# Patient Record
Sex: MALE | Race: BLACK OR AFRICAN AMERICAN | NOT HISPANIC OR LATINO | URBAN - METROPOLITAN AREA
[De-identification: names, ages, dates, MRNs, and addresses within clinical notes are randomized per-mention and may not be internally consistent; named-entity substitution may affect disease eponyms.]

---

## 2017-07-17 ENCOUNTER — OUTPATIENT (OUTPATIENT)
Dept: OUTPATIENT SERVICES | Facility: HOSPITAL | Age: 32
LOS: 1 days | Discharge: HOME | End: 2017-07-17

## 2017-07-21 DIAGNOSIS — Z23 ENCOUNTER FOR IMMUNIZATION: ICD-10-CM

## 2024-02-20 ENCOUNTER — INPATIENT (INPATIENT)
Facility: HOSPITAL | Age: 39
LOS: 6 days | Discharge: HOME | DRG: 280 | End: 2024-02-27
Attending: INTERNAL MEDICINE | Admitting: STUDENT IN AN ORGANIZED HEALTH CARE EDUCATION/TRAINING PROGRAM
Payer: SELF-PAY

## 2024-02-20 VITALS
TEMPERATURE: 98 F | DIASTOLIC BLOOD PRESSURE: 142 MMHG | SYSTOLIC BLOOD PRESSURE: 227 MMHG | WEIGHT: 229.94 LBS | RESPIRATION RATE: 20 BRPM | OXYGEN SATURATION: 98 % | HEART RATE: 102 BPM | HEIGHT: 72 IN

## 2024-02-20 DIAGNOSIS — I16.0 HYPERTENSIVE URGENCY: ICD-10-CM

## 2024-02-20 DIAGNOSIS — R79.89 OTHER SPECIFIED ABNORMAL FINDINGS OF BLOOD CHEMISTRY: ICD-10-CM

## 2024-02-20 DIAGNOSIS — N17.9 ACUTE KIDNEY FAILURE, UNSPECIFIED: ICD-10-CM

## 2024-02-20 LAB
ALBUMIN SERPL ELPH-MCNC: 3.7 G/DL — SIGNIFICANT CHANGE UP (ref 3.3–5.2)
ALP SERPL-CCNC: 74 U/L — SIGNIFICANT CHANGE UP (ref 40–120)
ALT FLD-CCNC: 66 U/L — HIGH
ANION GAP SERPL CALC-SCNC: 13 MMOL/L — SIGNIFICANT CHANGE UP (ref 5–17)
APTT BLD: 33.6 SEC — SIGNIFICANT CHANGE UP (ref 24.5–35.6)
AST SERPL-CCNC: 43 U/L — HIGH
BASOPHILS # BLD AUTO: 0.08 K/UL — SIGNIFICANT CHANGE UP (ref 0–0.2)
BASOPHILS NFR BLD AUTO: 0.7 % — SIGNIFICANT CHANGE UP (ref 0–2)
BILIRUB SERPL-MCNC: 0.5 MG/DL — SIGNIFICANT CHANGE UP (ref 0.4–2)
BUN SERPL-MCNC: 34.5 MG/DL — HIGH (ref 8–20)
CALCIUM SERPL-MCNC: 8.9 MG/DL — SIGNIFICANT CHANGE UP (ref 8.4–10.5)
CHLORIDE SERPL-SCNC: 101 MMOL/L — SIGNIFICANT CHANGE UP (ref 96–108)
CK SERPL-CCNC: 117 U/L — SIGNIFICANT CHANGE UP (ref 30–200)
CO2 SERPL-SCNC: 23 MMOL/L — SIGNIFICANT CHANGE UP (ref 22–29)
CREAT SERPL-MCNC: 2.61 MG/DL — HIGH (ref 0.5–1.3)
EGFR: 31 ML/MIN/1.73M2 — LOW
EOSINOPHIL # BLD AUTO: 0.26 K/UL — SIGNIFICANT CHANGE UP (ref 0–0.5)
EOSINOPHIL NFR BLD AUTO: 2.3 % — SIGNIFICANT CHANGE UP (ref 0–6)
GLUCOSE SERPL-MCNC: 104 MG/DL — HIGH (ref 70–99)
HCT VFR BLD CALC: 33.2 % — LOW (ref 39–50)
HGB BLD-MCNC: 10.5 G/DL — LOW (ref 13–17)
IMM GRANULOCYTES NFR BLD AUTO: 0.4 % — SIGNIFICANT CHANGE UP (ref 0–0.9)
INR BLD: 1.17 RATIO — SIGNIFICANT CHANGE UP (ref 0.85–1.18)
LYMPHOCYTES # BLD AUTO: 1.94 K/UL — SIGNIFICANT CHANGE UP (ref 1–3.3)
LYMPHOCYTES # BLD AUTO: 16.9 % — SIGNIFICANT CHANGE UP (ref 13–44)
MCHC RBC-ENTMCNC: 25.9 PG — LOW (ref 27–34)
MCHC RBC-ENTMCNC: 31.6 GM/DL — LOW (ref 32–36)
MCV RBC AUTO: 81.8 FL — SIGNIFICANT CHANGE UP (ref 80–100)
MONOCYTES # BLD AUTO: 1.08 K/UL — HIGH (ref 0–0.9)
MONOCYTES NFR BLD AUTO: 9.4 % — SIGNIFICANT CHANGE UP (ref 2–14)
NEUTROPHILS # BLD AUTO: 8.1 K/UL — HIGH (ref 1.8–7.4)
NEUTROPHILS NFR BLD AUTO: 70.3 % — SIGNIFICANT CHANGE UP (ref 43–77)
NT-PROBNP SERPL-SCNC: 5093 PG/ML — HIGH (ref 0–300)
PLATELET # BLD AUTO: 371 K/UL — SIGNIFICANT CHANGE UP (ref 150–400)
POTASSIUM SERPL-MCNC: 3.7 MMOL/L — SIGNIFICANT CHANGE UP (ref 3.5–5.3)
POTASSIUM SERPL-SCNC: 3.7 MMOL/L — SIGNIFICANT CHANGE UP (ref 3.5–5.3)
PROT SERPL-MCNC: 6.1 G/DL — LOW (ref 6.6–8.7)
PROTHROM AB SERPL-ACNC: 12.9 SEC — SIGNIFICANT CHANGE UP (ref 9.5–13)
RBC # BLD: 4.06 M/UL — LOW (ref 4.2–5.8)
RBC # FLD: 16.6 % — HIGH (ref 10.3–14.5)
SODIUM SERPL-SCNC: 137 MMOL/L — SIGNIFICANT CHANGE UP (ref 135–145)
TROPONIN T, HIGH SENSITIVITY RESULT: 67 NG/L — HIGH (ref 0–51)
TROPONIN T, HIGH SENSITIVITY RESULT: 71 NG/L — HIGH (ref 0–51)
TSH SERPL-MCNC: 1.23 UIU/ML — SIGNIFICANT CHANGE UP (ref 0.27–4.2)
WBC # BLD: 11.51 K/UL — HIGH (ref 3.8–10.5)
WBC # FLD AUTO: 11.51 K/UL — HIGH (ref 3.8–10.5)

## 2024-02-20 PROCEDURE — 71045 X-RAY EXAM CHEST 1 VIEW: CPT | Mod: 26

## 2024-02-20 PROCEDURE — 99223 1ST HOSP IP/OBS HIGH 75: CPT

## 2024-02-20 PROCEDURE — 99254 IP/OBS CNSLTJ NEW/EST MOD 60: CPT

## 2024-02-20 PROCEDURE — 99285 EMERGENCY DEPT VISIT HI MDM: CPT

## 2024-02-20 PROCEDURE — 70450 CT HEAD/BRAIN W/O DYE: CPT | Mod: 26,MA

## 2024-02-20 PROCEDURE — 93010 ELECTROCARDIOGRAM REPORT: CPT

## 2024-02-20 RX ORDER — ACETAMINOPHEN 500 MG
650 TABLET ORAL EVERY 6 HOURS
Refills: 0 | Status: DISCONTINUED | OUTPATIENT
Start: 2024-02-20 | End: 2024-02-27

## 2024-02-20 RX ORDER — ONDANSETRON 8 MG/1
4 TABLET, FILM COATED ORAL EVERY 8 HOURS
Refills: 0 | Status: DISCONTINUED | OUTPATIENT
Start: 2024-02-20 | End: 2024-02-27

## 2024-02-20 RX ORDER — LANOLIN ALCOHOL/MO/W.PET/CERES
3 CREAM (GRAM) TOPICAL AT BEDTIME
Refills: 0 | Status: DISCONTINUED | OUTPATIENT
Start: 2024-02-20 | End: 2024-02-27

## 2024-02-20 RX ORDER — MAGNESIUM SULFATE 500 MG/ML
2 VIAL (ML) INJECTION ONCE
Refills: 0 | Status: COMPLETED | OUTPATIENT
Start: 2024-02-20 | End: 2024-02-20

## 2024-02-20 RX ORDER — METOCLOPRAMIDE HCL 10 MG
10 TABLET ORAL ONCE
Refills: 0 | Status: COMPLETED | OUTPATIENT
Start: 2024-02-20 | End: 2024-02-20

## 2024-02-20 RX ORDER — LABETALOL HCL 100 MG
10 TABLET ORAL ONCE
Refills: 0 | Status: COMPLETED | OUTPATIENT
Start: 2024-02-20 | End: 2024-02-20

## 2024-02-20 RX ORDER — HYDRALAZINE HCL 50 MG
100 TABLET ORAL EVERY 8 HOURS
Refills: 0 | Status: DISCONTINUED | OUTPATIENT
Start: 2024-02-20 | End: 2024-02-21

## 2024-02-20 RX ORDER — HYDRALAZINE HCL 50 MG
10 TABLET ORAL ONCE
Refills: 0 | Status: COMPLETED | OUTPATIENT
Start: 2024-02-20 | End: 2024-02-20

## 2024-02-20 RX ORDER — FUROSEMIDE 40 MG
20 TABLET ORAL ONCE
Refills: 0 | Status: COMPLETED | OUTPATIENT
Start: 2024-02-20 | End: 2024-02-20

## 2024-02-20 RX ORDER — ACETAMINOPHEN 500 MG
1000 TABLET ORAL ONCE
Refills: 0 | Status: COMPLETED | OUTPATIENT
Start: 2024-02-20 | End: 2024-02-20

## 2024-02-20 RX ORDER — HEPARIN SODIUM 5000 [USP'U]/ML
5000 INJECTION INTRAVENOUS; SUBCUTANEOUS EVERY 8 HOURS
Refills: 0 | Status: DISCONTINUED | OUTPATIENT
Start: 2024-02-20 | End: 2024-02-27

## 2024-02-20 RX ADMIN — Medication 10 MILLIGRAM(S): at 18:50

## 2024-02-20 RX ADMIN — Medication 400 MILLIGRAM(S): at 22:49

## 2024-02-20 RX ADMIN — Medication 100 MILLIGRAM(S): at 18:50

## 2024-02-20 RX ADMIN — Medication 20 MILLIGRAM(S): at 17:23

## 2024-02-20 RX ADMIN — ONDANSETRON 4 MILLIGRAM(S): 8 TABLET, FILM COATED ORAL at 22:56

## 2024-02-20 RX ADMIN — Medication 10 MILLIGRAM(S): at 17:22

## 2024-02-20 RX ADMIN — Medication 100 MILLIGRAM(S): at 22:22

## 2024-02-20 RX ADMIN — Medication 10 MILLIGRAM(S): at 22:49

## 2024-02-20 RX ADMIN — Medication 150 GRAM(S): at 17:23

## 2024-02-20 RX ADMIN — Medication 10 MILLIGRAM(S): at 17:49

## 2024-02-20 NOTE — ED ADULT NURSE NOTE - OBJECTIVE STATEMENT
Pt a&ox3, in ED for elevated BP, headache x 3 weeks, intermittent N/V/D and constipation, and CP when sleeping, no SOB, unlabored breathing, equal rise & fall, able to speak in full sentences, no PMH/PSH, pt does not f/u with a PMD.

## 2024-02-20 NOTE — ED PROVIDER NOTE - CARDIAC, MLM
Monitor: The patient's morbid obesity is improving with lifestyle modifications.  Evaluation: No diagnostic tests required today.  Assessment/Treatment:  Discussed the patient's BMI.  The BMI is above average. The patient received dietary education, feeding regime and exercise education because they have an above normal BMI.  General weight loss/lifestyle modification strategies discussed (elicit support from others; identify saboteurs; non-food rewards, etc).  Informal exercise measures discussed, e.g. taking stairs instead of elevator.  Regular aerobic exercise program discussed.  Condition will be reassessed in 6 months    Normal rate, regular rhythm.  Heart sounds S1, S2.  No murmurs, rubs or gallops.

## 2024-02-20 NOTE — ED ADULT NURSE NOTE - NSFALLUNIVINTERV_ED_ALL_ED
Bed/Stretcher in lowest position, wheels locked, appropriate side rails in place/Call bell, personal items and telephone in reach/Instruct patient to call for assistance before getting out of bed/chair/stretcher/Non-slip footwear applied when patient is off stretcher/[place] to call system/Physically safe environment - no spills, clutter or unnecessary equipment/Purposeful proactive rounding/Room/bathroom lighting operational, light cord in reach

## 2024-02-20 NOTE — ED PROVIDER NOTE - PROGRESS NOTE DETAILS
, attending: patient received as sign out. patient given additional labetolol 10 mg IV. BP improved to 195/121. repeat EKG showed T wave inversion in inferior lateral leads, II, III, AVF, V4-6. patient had elevated trop and creatine, likley demand from hypertension. cardiology seen the patient, will need gradual reduction  of BP, echo, renal sono. patient admitted to medicine.

## 2024-02-20 NOTE — CONSULT NOTE ADULT - ASSESSMENT
37 y/o male with No past medical hx  has not been to a Dr in many years who presents with 3 weeks of headaches, dizziness and vomiting.  Patients symptoms persisted today and he could not take it any longer and he presented to the ER. On evaluation he was found to have a b/p of 227/147.  We are being asked to see him for b/p control.  He states he does not get any chest pain when he walks but did get pain in his chest last night when he went to the bathroom but it was on inspiration and he has had not chest pain today.  He denies any back pain or head trauma  ekg changes c/w LVH  trop 71  bnp [id number]

## 2024-02-20 NOTE — ED ADULT NURSE REASSESSMENT NOTE - NS ED NURSE REASSESS COMMENT FT1
Report given to CDU RAMA Snider. Pt breathing even and unlabored, no acute distress noted. Pt able to make needs known. Pt to be moved to CDU 14L. Pt placed on telebox. family at bedside.

## 2024-02-20 NOTE — H&P ADULT - HISTORY OF PRESENT ILLNESS
Patient seen/examined prior to midnight on 2/20/24.    38M with no PMHX or recent medical care, undomiciled currently living in his car working in [employer], Tobacco Abuse/Chronic Smoker presents to [facility] ER c/o worsening HA for past 2 weeks. ROS +SOB/CARDENAS. EKG +Diffuse TWI and LVH. CTH WO negative. Uncontrolled BP on arrival /142 currently 184/104 after IV Hydralazine/Labetalol/Lasix in ED. Labs significant for elevated troponin and pBNP, Cr 2.61 (no hx kidney disease), and anemia. Cardiology Consulted. Pt c/o HA. Family at bedside. Denies current CP/SOB.     ROS negative unless mentioned.    PMHX: Denies  PSHX: Adenoidectomy  FamHx: Denies fam hx CHF  Social Hx: +Current Smoker  NKDA

## 2024-02-20 NOTE — ED PROVIDER NOTE - CARE PLAN
1 Principal Discharge DX:	Hypertensive urgency  Secondary Diagnosis:	Elevated troponin  Secondary Diagnosis:	Acute renal failure

## 2024-02-20 NOTE — ED PROVIDER NOTE - OBJECTIVE STATEMENT
38-year-old male with history of asthma presents with persistent headache for 15 days with episodes of dizziness and lightheadedness to the point where he is ataxic and has to hold the walls to walk.  Today patient had persistent headache and had to leave work and had 1 episode of vomiting.  Patient is also having episodes where he is waking up in the middle of the night and is unable to breathe and will sit up and is able to breathe and will go back to sleep.  Patient has a family history of hypertension but has never been diagnosed with hypertension before.  Patient had no recent weight gain.Patient denies any drug use or fall. trauma

## 2024-02-20 NOTE — ED ADULT NURSE NOTE - CHIEF COMPLAINT QUOTE
pt states she has dizziness nausea headache x 3 weeks, to the point where he had to leave work  A&Ox3, resp wnl,

## 2024-02-20 NOTE — CONSULT NOTE ADULT - PROBLEM SELECTOR RECOMMENDATION 2
Type 2 non stemi from demand ischemia  Had atypical cp  trend trop  control b/p   Echo to evaluate LV function  check lipid panel in am and start statin if necessary  ? chf  no clinical evidence of chf  await echo Type 2 non stemi from demand ischemia  Had atypical cp  trend trop  control b/p   Echo to evaluate LV function  check lipid panel in am and start statin if necessary  ? chf  no clinical evidence of chf  await echo  ischemic work up pending echo results

## 2024-02-20 NOTE — H&P ADULT - NSHPLABSRESULTS_GEN_ALL_CORE
CTH WO IMPRESSION:   No acute intracranial hemorrhage, mass effect, or shift of   the midline structures.

## 2024-02-20 NOTE — H&P ADULT - ASSESSMENT
ASSESSMENT:  38M with no PMHX or recent medical care, undomiciled currently living in his car working in [employer], Tobacco Abuse/Chronic Smoker presents to [facility] ER c/o worsening HA for past 2 weeks admitted for HA 2/2 HTN Urgency c/b Elevated Troponin and ARF v CKD.    PLAN:  HA 2/2 HTN Urgency c/b Elevated Troponin  -CTH WO negative  -EKG NSR LVH Diffuse TW changes  -Admit to Tele  -TTE pending  -Renin/Aldosterone levels pending  -TSH WNL  -Trend Cardiac Enzymes  -Mag Sulfate 2g IV x1  -Check Electrolytes  -Magnesium Sulfate 2g IV x1  -Add Urine Metanephrines as serum metanephrines already ordered  -Check UDS  -Labetalol 10mg IV x2, Hydralazine 10mg IV x1, Lasix 20mg IV x1 in ED  -Appears euvolemic diuretic unnecessary per Cardio  -Hydralazine 100mg PO q8  -Reglan and Ofirmev for HA  -Cardio Consulted ([facility]) appreciate reccs    ARF v CKD  -Likely 2/2 hypertensive renal disease   -CPK WNL  -Renal Artery Duplex/US pending  -Check Urine Studies  -Nephro Consulted ([facility])    Normocytic Anemia likely 2/2 AOCKD  -Chekc B12/Folate/Ferritin/Iron Studies  -Trend CBC    Tobacco Abuse  - Cessation. Nicotine Patch PRN withdrawal    Undomiciled  -Declined SW consultation

## 2024-02-20 NOTE — ED PROVIDER NOTE - CLINICAL SUMMARY MEDICAL DECISION MAKING FREE TEXT BOX
Patient had headache with vomiting  with  high blood pressure , will do Friday CT head control of blood pressure and rule out ACS as acute heart failure and also to the chest x-ray.  Patient will likely need admission for cardiac evaluation for hypertensive emergency paroxysmal nocturnal dyspnea associated with that.  Friday CT showed no acute hemorrhage we will continue blood pressure control and reassess

## 2024-02-21 LAB
AMPHET UR-MCNC: NEGATIVE — SIGNIFICANT CHANGE UP
ANION GAP SERPL CALC-SCNC: 17 MMOL/L — SIGNIFICANT CHANGE UP (ref 5–17)
APPEARANCE UR: CLEAR — SIGNIFICANT CHANGE UP
BACTERIA # UR AUTO: NEGATIVE /HPF — SIGNIFICANT CHANGE UP
BARBITURATES UR SCN-MCNC: NEGATIVE — SIGNIFICANT CHANGE UP
BENZODIAZ UR-MCNC: NEGATIVE — SIGNIFICANT CHANGE UP
BILIRUB UR-MCNC: NEGATIVE — SIGNIFICANT CHANGE UP
BUN SERPL-MCNC: 31.4 MG/DL — HIGH (ref 8–20)
CALCIUM SERPL-MCNC: 8.6 MG/DL — SIGNIFICANT CHANGE UP (ref 8.4–10.5)
CAST: 1 /LPF — SIGNIFICANT CHANGE UP (ref 0–4)
CHLORIDE SERPL-SCNC: 100 MMOL/L — SIGNIFICANT CHANGE UP (ref 96–108)
CHOLEST SERPL-MCNC: 161 MG/DL — SIGNIFICANT CHANGE UP
CO2 SERPL-SCNC: 20 MMOL/L — LOW (ref 22–29)
COCAINE METAB.OTHER UR-MCNC: NEGATIVE — SIGNIFICANT CHANGE UP
COLOR SPEC: YELLOW — SIGNIFICANT CHANGE UP
CREAT ?TM UR-MCNC: 172 MG/DL — SIGNIFICANT CHANGE UP
CREAT SERPL-MCNC: 2.13 MG/DL — HIGH (ref 0.5–1.3)
DIFF PNL FLD: NEGATIVE — SIGNIFICANT CHANGE UP
EGFR: 40 ML/MIN/1.73M2 — LOW
FERRITIN SERPL-MCNC: 40 NG/ML — SIGNIFICANT CHANGE UP (ref 30–400)
FOLATE SERPL-MCNC: 14.2 NG/ML — SIGNIFICANT CHANGE UP
GLUCOSE SERPL-MCNC: 105 MG/DL — HIGH (ref 70–99)
GLUCOSE UR QL: 250 MG/DL
HCT VFR BLD CALC: 35 % — LOW (ref 39–50)
HDLC SERPL-MCNC: 45 MG/DL — SIGNIFICANT CHANGE UP
HGB BLD-MCNC: 11.3 G/DL — LOW (ref 13–17)
IRON SATN MFR SERPL: 23 UG/DL — LOW (ref 59–158)
IRON SATN MFR SERPL: 5 % — LOW (ref 16–55)
KETONES UR-MCNC: ABNORMAL MG/DL
LEUKOCYTE ESTERASE UR-ACNC: NEGATIVE — SIGNIFICANT CHANGE UP
LIPID PNL WITH DIRECT LDL SERPL: 99 MG/DL — SIGNIFICANT CHANGE UP
MAGNESIUM SERPL-MCNC: 2.3 MG/DL — SIGNIFICANT CHANGE UP (ref 1.6–2.6)
MCHC RBC-ENTMCNC: 26.4 PG — LOW (ref 27–34)
MCHC RBC-ENTMCNC: 32.3 GM/DL — SIGNIFICANT CHANGE UP (ref 32–36)
MCV RBC AUTO: 81.8 FL — SIGNIFICANT CHANGE UP (ref 80–100)
METHADONE UR-MCNC: NEGATIVE — SIGNIFICANT CHANGE UP
NITRITE UR-MCNC: NEGATIVE — SIGNIFICANT CHANGE UP
NON HDL CHOLESTEROL: 116 MG/DL — SIGNIFICANT CHANGE UP
OPIATES UR-MCNC: NEGATIVE — SIGNIFICANT CHANGE UP
OSMOLALITY UR: 668 MOSM/KG — SIGNIFICANT CHANGE UP (ref 300–1000)
PCP SPEC-MCNC: SIGNIFICANT CHANGE UP
PCP UR-MCNC: NEGATIVE — SIGNIFICANT CHANGE UP
PH UR: 6 — SIGNIFICANT CHANGE UP (ref 5–8)
PHOSPHATE SERPL-MCNC: 2.8 MG/DL — SIGNIFICANT CHANGE UP (ref 2.4–4.7)
PLATELET # BLD AUTO: 255 K/UL — SIGNIFICANT CHANGE UP (ref 150–400)
POTASSIUM SERPL-MCNC: 3.6 MMOL/L — SIGNIFICANT CHANGE UP (ref 3.5–5.3)
POTASSIUM SERPL-SCNC: 3.6 MMOL/L — SIGNIFICANT CHANGE UP (ref 3.5–5.3)
POTASSIUM UR-SCNC: 39 MMOL/L — SIGNIFICANT CHANGE UP
PROT ?TM UR-MCNC: 73 MG/DL — HIGH (ref 0–12)
PROT UR-MCNC: 100 MG/DL
PROT/CREAT UR-RTO: 0.4 RATIO — HIGH
RBC # BLD: 4.28 M/UL — SIGNIFICANT CHANGE UP (ref 4.2–5.8)
RBC # FLD: 17 % — HIGH (ref 10.3–14.5)
RBC CASTS # UR COMP ASSIST: 0 /HPF — SIGNIFICANT CHANGE UP (ref 0–4)
SODIUM SERPL-SCNC: 137 MMOL/L — SIGNIFICANT CHANGE UP (ref 135–145)
SODIUM UR-SCNC: 46 MMOL/L — SIGNIFICANT CHANGE UP
SP GR SPEC: 1.02 — SIGNIFICANT CHANGE UP (ref 1–1.03)
SQUAMOUS # UR AUTO: 1 /HPF — SIGNIFICANT CHANGE UP (ref 0–5)
THC UR QL: POSITIVE
TIBC SERPL-MCNC: 480 UG/DL — HIGH (ref 220–430)
TRANSFERRIN SERPL-MCNC: 336 MG/DL — HIGH (ref 180–329)
TRIGL SERPL-MCNC: 86 MG/DL — SIGNIFICANT CHANGE UP
TROPONIN T, HIGH SENSITIVITY RESULT: 86 NG/L — HIGH (ref 0–51)
UROBILINOGEN FLD QL: 1 MG/DL — SIGNIFICANT CHANGE UP (ref 0.2–1)
VIT B12 SERPL-MCNC: 1138 PG/ML — SIGNIFICANT CHANGE UP (ref 232–1245)
WBC # BLD: 13.6 K/UL — HIGH (ref 3.8–10.5)
WBC # FLD AUTO: 13.6 K/UL — HIGH (ref 3.8–10.5)
WBC UR QL: 2 /HPF — SIGNIFICANT CHANGE UP (ref 0–5)

## 2024-02-21 PROCEDURE — 70551 MRI BRAIN STEM W/O DYE: CPT | Mod: 26

## 2024-02-21 PROCEDURE — 99233 SBSQ HOSP IP/OBS HIGH 50: CPT

## 2024-02-21 PROCEDURE — 99234 HOSP IP/OBS SM DT SF/LOW 45: CPT

## 2024-02-21 PROCEDURE — 99255 IP/OBS CONSLTJ NEW/EST HI 80: CPT

## 2024-02-21 PROCEDURE — 93975 VASCULAR STUDY: CPT | Mod: 26

## 2024-02-21 PROCEDURE — 93010 ELECTROCARDIOGRAM REPORT: CPT

## 2024-02-21 RX ORDER — HYDROMORPHONE HYDROCHLORIDE 2 MG/ML
0.5 INJECTION INTRAMUSCULAR; INTRAVENOUS; SUBCUTANEOUS THREE TIMES A DAY
Refills: 0 | Status: DISCONTINUED | OUTPATIENT
Start: 2024-02-21 | End: 2024-02-22

## 2024-02-21 RX ORDER — OXYCODONE HYDROCHLORIDE 5 MG/1
5 TABLET ORAL THREE TIMES A DAY
Refills: 0 | Status: DISCONTINUED | OUTPATIENT
Start: 2024-02-21 | End: 2024-02-27

## 2024-02-21 RX ORDER — LABETALOL HCL 100 MG
10 TABLET ORAL EVERY 4 HOURS
Refills: 0 | Status: DISCONTINUED | OUTPATIENT
Start: 2024-02-21 | End: 2024-02-27

## 2024-02-21 RX ORDER — HYDRALAZINE HCL 50 MG
100 TABLET ORAL EVERY 8 HOURS
Refills: 0 | Status: DISCONTINUED | OUTPATIENT
Start: 2024-02-21 | End: 2024-02-27

## 2024-02-21 RX ORDER — LABETALOL HCL 100 MG
10 TABLET ORAL EVERY 4 HOURS
Refills: 0 | Status: DISCONTINUED | OUTPATIENT
Start: 2024-02-21 | End: 2024-02-21

## 2024-02-21 RX ORDER — METOCLOPRAMIDE HCL 10 MG
10 TABLET ORAL THREE TIMES A DAY
Refills: 0 | Status: DISCONTINUED | OUTPATIENT
Start: 2024-02-21 | End: 2024-02-27

## 2024-02-21 RX ORDER — HYDRALAZINE HCL 50 MG
100 TABLET ORAL EVERY 8 HOURS
Refills: 0 | Status: DISCONTINUED | OUTPATIENT
Start: 2024-02-21 | End: 2024-02-21

## 2024-02-21 RX ORDER — ACETAMINOPHEN 500 MG
1000 TABLET ORAL ONCE
Refills: 0 | Status: COMPLETED | OUTPATIENT
Start: 2024-02-21 | End: 2024-02-21

## 2024-02-21 RX ADMIN — HEPARIN SODIUM 5000 UNIT(S): 5000 INJECTION INTRAVENOUS; SUBCUTANEOUS at 05:36

## 2024-02-21 RX ADMIN — Medication 650 MILLIGRAM(S): at 22:00

## 2024-02-21 RX ADMIN — OXYCODONE HYDROCHLORIDE 5 MILLIGRAM(S): 5 TABLET ORAL at 12:15

## 2024-02-21 RX ADMIN — Medication 10 MILLIGRAM(S): at 16:06

## 2024-02-21 RX ADMIN — Medication 650 MILLIGRAM(S): at 20:40

## 2024-02-21 RX ADMIN — HYDROMORPHONE HYDROCHLORIDE 0.5 MILLIGRAM(S): 2 INJECTION INTRAMUSCULAR; INTRAVENOUS; SUBCUTANEOUS at 22:00

## 2024-02-21 RX ADMIN — OXYCODONE HYDROCHLORIDE 5 MILLIGRAM(S): 5 TABLET ORAL at 20:40

## 2024-02-21 RX ADMIN — HEPARIN SODIUM 5000 UNIT(S): 5000 INJECTION INTRAVENOUS; SUBCUTANEOUS at 14:10

## 2024-02-21 RX ADMIN — OXYCODONE HYDROCHLORIDE 5 MILLIGRAM(S): 5 TABLET ORAL at 11:44

## 2024-02-21 RX ADMIN — Medication 650 MILLIGRAM(S): at 05:35

## 2024-02-21 RX ADMIN — Medication 400 MILLIGRAM(S): at 08:10

## 2024-02-21 RX ADMIN — Medication 10 MILLIGRAM(S): at 20:41

## 2024-02-21 RX ADMIN — OXYCODONE HYDROCHLORIDE 5 MILLIGRAM(S): 5 TABLET ORAL at 22:00

## 2024-02-21 RX ADMIN — HYDROMORPHONE HYDROCHLORIDE 0.5 MILLIGRAM(S): 2 INJECTION INTRAMUSCULAR; INTRAVENOUS; SUBCUTANEOUS at 14:39

## 2024-02-21 RX ADMIN — HEPARIN SODIUM 5000 UNIT(S): 5000 INJECTION INTRAVENOUS; SUBCUTANEOUS at 21:35

## 2024-02-21 RX ADMIN — Medication 1000 MILLIGRAM(S): at 08:40

## 2024-02-21 RX ADMIN — Medication 10 MILLIGRAM(S): at 21:00

## 2024-02-21 RX ADMIN — Medication 100 MILLIGRAM(S): at 05:36

## 2024-02-21 RX ADMIN — HYDROMORPHONE HYDROCHLORIDE 0.5 MILLIGRAM(S): 2 INJECTION INTRAMUSCULAR; INTRAVENOUS; SUBCUTANEOUS at 15:10

## 2024-02-21 RX ADMIN — Medication 100 MILLIGRAM(S): at 14:10

## 2024-02-21 RX ADMIN — Medication 10 MILLIGRAM(S): at 00:50

## 2024-02-21 RX ADMIN — Medication 100 MILLIGRAM(S): at 21:34

## 2024-02-21 RX ADMIN — HYDROMORPHONE HYDROCHLORIDE 0.5 MILLIGRAM(S): 2 INJECTION INTRAMUSCULAR; INTRAVENOUS; SUBCUTANEOUS at 21:35

## 2024-02-21 RX ADMIN — ONDANSETRON 4 MILLIGRAM(S): 8 TABLET, FILM COATED ORAL at 12:51

## 2024-02-21 RX ADMIN — Medication 10 MILLIGRAM(S): at 11:44

## 2024-02-21 NOTE — PROGRESS NOTE ADULT - NS ATTEND AMEND GEN_ALL_CORE FT
seen with above,    38M no routine medical care and currently living in his car working as [employer], chronic smoker, presents with worsening headaches for 2 weeks with also intermittent exertional dyspnea x1 year, found with severely elevated /142, lab work also significant for Cr. 2.6 (GFR 31), pBNP 5K and Hb 10, EKG with diffuse T-wave inversion and LVH, CT head no acute pathology, CXR with prominent cardiac silloute, was given IVP hydralazine, labetalol and Lasix in the ER, cardiology consulted for evaluation  -suspect chronically undiagnosed/untreated HTN with hypertensive cardiomyopathy, hypertensive CKD; given neurological symptoms need to gradually lower BP avoid dramatic fluctuation, started on hydralazine 100mg TID, will avoid Imdur given persistent severe headache, recommend MRI brain  -Echo pending likely will have reduced LV EF, appears euvolemic does not need maintenance diuretic   -Cr. 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 2.6 (GFR 31), pBNP 5K and Hb 10, EKG with diffuse T-wave inversion and LVH, CT head no acute pathology, CXR with prominent cardiac silloute, was given IVP hydralazine, labetalol and Lasix in the ER, cardiology consulted for evaluation  -suspect chronically undiagnosed/untreated HTN with hypertensive cardiomyopathy, hypertensive CKD; given neurological symptoms need to gradually lower BP avoid dramatic fluctuation, started on hydralazine 100mg TID, will avoid Imdur given persistent severe headache, recommend MRI brain  -Echo pending likely will have reduced LV EF, appears euvolemic does not need maintenance diuretic   -Mildly elevated Trop likely demand related ischemia  -Cr. 2.6--> 2.1, renal artery Duplex to exclude stenosis and nephrology evaluation, at risk for progression to early onset ESRD   -sent off renin/chintan and metanephrine level for secondary workup  -normocytic anemia likely due to CKD, iron panel and ferritin sent  -he declines social work intervention for being homeless, has a mother in her 60s living in [place] and a 31 years old siblings, denies family h/o CHF        Nehemias Swan DO, [facility]  Faculty Non-Invasive Cardiologist  [phone]

## 2024-02-21 NOTE — ED ADULT NURSE REASSESSMENT NOTE - NS ED NURSE REASSESS COMMENT FT1
Pt breathing even and unlabored at this time, no acute distress noted. Pt resting comfortably with no complaints, awaiting admission to CDU. pt waiting admission due to BP, Dr. Maurice aware of BP, prescribed PRN Labetalol. cardiac monitor maintained. Stretcher in lowest position, wheels locked, call bell within reach.

## 2024-02-21 NOTE — PROGRESS NOTE ADULT - SUBJECTIVE AND OBJECTIVE BOX
[facility]                                                         CARDIOLOGY AT [facility]                                                                  [address], [place] [address]                                                         Telephone: [phone]. Fax:[phone]                                                                             PROGRESS NOTE    Reason for follow up: HTN urgency   Update: seen in ED c/o headaches relieved with tylenol denies complaints of chest pain/sob/dizziness/palps   echo pending   BP somewhat  improved       Review of symptoms:   Cardiac:  No chest pain. No dyspnea. No palpitations.  Respiratory: no cough. No dyspnea  Gastrointestinal: No diarrhea. No abdominal pain. No bleeding.   Neuro: No focal neuro complaints.    Vitals:  T(C): 36.9 (02-21-24 @ 07:32), Max: 36.9 (02-21-24 @ 07:32)  HR: 102 (02-21-24 @ 07:32) (80 - 103)  BP: 173/94 (02-21-24 @ 07:32) (153/75 - 227/142)  RR: 18 (02-21-24 @ 05:15) (16 - 20)  SpO2: 98% (02-21-24 @ 07:32) (97% - 98%)  Wt(kg): --  I&O's Summary    Weight (kg): 104.3 (02-20 @ 16:01)    PHYSICAL EXAM:  Appearance: Comfortable. No acute distress  HEENT:  Atraumatic. Normocephalic.  Normal oral mucosa  Neurologic: A & O x 3, no gross focal deficits.  Cardiovascular: RRR S1 S2, 65  No murmur, no rubs/gallops. No JVD  Respiratory: Lungs clear to auscultation, unlabored   Gastrointestinal:  Soft, Non-tender, + BS  Lower Extremities: 2+ Peripheral Pulses, No clubbing, cyanosis, or edema  Psychiatry: Patient is calm. No agitation.   Skin: warm and dry.    CURRENT CARDIAC MEDICATIONS:  hydrALAZINE 100 milliGRAM(s) Oral every 8 hours  labetalol Injectable 10 milliGRAM(s) IV Push every 4 hours PRN      CURRENT OTHER MEDICATIONS:  acetaminophen     Tablet .. 650 milliGRAM(s) Oral every 6 hours PRN Temp greater or equal to 38C (100.4F), Mild Pain (1 - 3)  melatonin 3 milliGRAM(s) Oral at bedtime PRN Insomnia  ondansetron Injectable 4 milliGRAM(s) IV Push every 8 hours PRN Nausea and/or Vomiting  aluminum hydroxide/magnesium hydroxide/simethicone Suspension 30 milliLiter(s) Oral every 4 hours PRN Dyspepsia  heparin   Injectable 5000 Unit(s) SubCutaneous every 8 hours      LABS:	 	  ( 20 Feb 2024 17:05 )  Troponin T  X    ,  CPK  117  , CKMB  X    , BNP X                                  11.3   13.60 )-----------( 255      ( 21 Feb 2024 08:36 )             35.0     02-21    137  |  100  |  31.4<H>  ----------------------------<  105<H>  3.6   |  20.0<L>  |  2.13<H>    Ca    8.6      21 Feb 2024 08:36  Phos  2.8     02-21  Mg     2.3     02-21    TPro  6.1<L>  /  Alb  3.7  /  TBili  0.5  /  DBili  x   /  AST  43<H>  /  ALT  66<H>  /  AlkPhos  74  02-20    PT/INR/PTT ( 20 Feb 2024 17:05 )                       :                       :      12.9         :       33.6                  .        .                   .              .           .       1.17        .                                       Lipid Profile: Date: 02-21 @ 08:36  Total cholesterol 161; Direct LDL: --; HDL: 45; Triglycerides:86    HgA1c:   TSH: Thyroid Stimulating Hormone, Serum: 1.23 uIU/mL      TELEMETRY: RSR 90's no events  ECG: RSR 94 LVH repol abn    DIAGNOSTIC TESTING:  echo pending     < from: CT Head No Cont (02.20.24 @ 16:56) >  IMPRESSION: No acute intracranial hemorrhage, mass effect, or shift of   the midline structures.    < end of copied text >

## 2024-02-21 NOTE — CONSULT NOTE ADULT - ASSESSMENT
38-year-old male with no medical follow-up reports no significant past medical history presented with headache found to have hypertensive urgency, elevated troponin and serum creatinine    Acute kidney injury on chronic kidney disease, NOS  Hypertensive urgency  Anemia, iron deficiency    Baseline serum creatinine unclear, serum creatinine 2.6 on arrival  UA with: No hematuria, UPCR 0.4  Patient likely to have some underlying hypertensive nephrosclerosis 38-year-old male with no medical follow-up reports no significant past medical history presented with headache found to have hypertensive urgency, elevated troponin and serum creatinine    Acute kidney injury on chronic kidney disease, NOS  Hypertensive urgency  Anemia, iron deficiency    ·	Baseline serum creatinine unclear, serum creatinine 2.6 on arrival  ·	UA with: No hematuria, UPCR 0.4  ·	Patient likely to have some underlying Chronic hypertensive nephrosclerosis  ·	Acute component could be related to malignant hypertension  ·	Will check renal ultrasound  ·	Recommend IV iron for iron deficiency anemia   ·	UA with glucosuria, recommend to check A1c to rule out DM  ·	Start on amlodipine, continue on hydralazine.  Cardiology on board

## 2024-02-21 NOTE — PROGRESS NOTE ADULT - SUBJECTIVE AND OBJECTIVE BOX
Aletha Olivera M.D.    Patient is a 38y old  Male who presents with a chief complaint of HTN Urgency, Elevated Trop, ARF v CKD, Anemia (21 Feb 2024 10:06)      SUBJECTIVE / OVERNIGHT EVENTS: still reports severe headache and some nausea. Denies double vision.     Patient denies chest pain, SOB, abd pain, N/V, fever, chills, dysuria or any other complaints. All remainder ROS negative.     MEDICATIONS  (STANDING):  heparin   Injectable 5000 Unit(s) SubCutaneous every 8 hours  hydrALAZINE 100 milliGRAM(s) Oral every 8 hours    MEDICATIONS  (PRN):  acetaminophen     Tablet .. 650 milliGRAM(s) Oral every 6 hours PRN Temp greater or equal to 38C (100.4F), Mild Pain (1 - 3)  aluminum hydroxide/magnesium hydroxide/simethicone Suspension 30 milliLiter(s) Oral every 4 hours PRN Dyspepsia  labetalol Injectable 10 milliGRAM(s) IV Push every 4 hours PRN SBP >170  melatonin 3 milliGRAM(s) Oral at bedtime PRN Insomnia  ondansetron Injectable 4 milliGRAM(s) IV Push every 8 hours PRN Nausea and/or Vomiting  oxyCODONE    IR 5 milliGRAM(s) Oral three times a day PRN Moderate Pain (4 - 6) and Severe Pain (7-10)      I&O's Summary      PHYSICAL EXAM:  Vital Signs Last 24 Hrs  T(C): 36.7 (21 Feb 2024 10:55), Max: 36.9 (21 Feb 2024 07:32)  T(F): 98 (21 Feb 2024 10:55), Max: 98.5 (21 Feb 2024 07:32)  HR: 100 (21 Feb 2024 10:55) (80 - 103)  BP: 167/89 (21 Feb 2024 10:55) (153/75 - 227/142)  BP(mean): --  RR: 18 (21 Feb 2024 05:15) (16 - 20)  SpO2: 98% (21 Feb 2024 10:55) (97% - 98%)    Parameters below as of 21 Feb 2024 05:15  Patient On (Oxygen Delivery Method): room air    CONSTITUTIONAL: NAD, well-groomed  ENMT: Moist oral mucosa, no pharyngeal injection or exudates; normal dentition  RESPIRATORY: Normal respiratory effort; lungs are clear to auscultation bilaterally  CARDIOVASCULAR: Regular rate and rhythm, normal S1 and S2, no murmur/rub/gallop; No lower extremity edema; Peripheral pulses are 2+ bilaterally  ABDOMEN: Nontender to palpation, normoactive bowel sounds, no rebound/guarding; No hepatosplenomegaly  MUSCLOSKELETAL:  Normal gait; no clubbing or cyanosis of digits; no joint swelling or tenderness to palpation  PSYCH: A+O x3; affect appropriate  NEUROLOGY: CN 2-12 are intact and symmetric; no gross sensory deficits;   SKIN: No rashes; no palpable lesions    LABS:                        11.3   13.60 )-----------( 255      ( 21 Feb 2024 08:36 )             35.0     02-21    137  |  100  |  31.4<H>  ----------------------------<  105<H>  3.6   |  20.0<L>  |  2.13<H>    Ca    8.6      21 Feb 2024 08:36  Phos  2.8     02-21  Mg     2.3     02-21    TPro  6.1<L>  /  Alb  3.7  /  TBili  0.5  /  DBili  x   /  AST  43<H>  /  ALT  66<H>  /  AlkPhos  74  02-20    PT/INR - ( 20 Feb 2024 17:05 )   PT: 12.9 sec;   INR: 1.17 ratio         PTT - ( 20 Feb 2024 17:05 )  PTT:33.6 sec  CARDIAC MARKERS ( 20 Feb 2024 17:05 )  x     / x     / 117 U/L / x     / x          Urinalysis Basic - ( 21 Feb 2024 08:36 )    Color: x / Appearance: x / SG: x / pH: x  Gluc: 105 mg/dL / Ketone: x  / Bili: x / Urobili: x   Blood: x / Protein: x / Nitrite: x   Leuk Esterase: x / RBC: x / WBC x   Sq Epi: x / Non Sq Epi: x / Bacteria: x        CAPILLARY BLOOD GLUCOSE          RADIOLOGY & ADDITIONAL TESTS:  Results Reviewed:   Imaging Personally Reviewed:  Electrocardiogram Personally Reviewed:

## 2024-02-21 NOTE — PROGRESS NOTE ADULT - ASSESSMENT
38M with no PMHX or recent medical care, undomiciled currently living in his car working in [employer], Tobacco Abuse/Chronic Smoker presents to [facility] ER c/o worsening HA for past 2 weeks admitted for HA 2/2 HTN Urgency c/b Elevated Troponin and ARF v CKD.    #HA 2/2 HTN Urgency c/b Elevated Troponin -- uncontrolled  -CTH WO negative, given persistent headache, will check MR brain  -Pain regimen  -EKG NSR LVH Diffuse TW changes  -Admit to Tele  -TTE pending  -Renin/Aldosterone levels,  Urine metanephrine serum metanephrine pending  -TSH WNL  -check renal artery duplex  -trend trop to peak  -Appears euvolemic diuretic unnecessary per Cardio  -Hydralazine 100mg PO q8 + PRN Labetolol  -Cardio Consulted ([facility]) appreciate reccs    ARF v CKD  -Likely 2/2 hypertensive renal disease   -CPK WNL  -Renal Artery Duplex/US pending  -Check Urine Studies  -Nephro Consulted, luisa recs    Normocytic Anemia likely 2/2 AOCKD  -B12/Folate normal  -Ferritin/Iron Studies consistent with chronic disease  -Trend CBC    Tobacco Abuse  - Cessation. Nicotine Patch PRN withdrawal    Undomiciled  -Declined SW consultation    Dispo: active

## 2024-02-21 NOTE — PROGRESS NOTE ADULT - ASSESSMENT
37 y/o male with No past medical hx  has not been to a Dr in many years who presents with 3 weeks of headaches, dizziness and vomiting.    Patients symptoms persisted today and he could not take it any longer and he presented to the ER.   On evaluation he was found to have a b/p of 227/147.  We are being asked to see him for b/p control.    He states he does not get any chest pain when he walks but did get pain in his chest last night when he went to the bathroom but it was on inspiration and   he has had not chest pain today.  He denies any back pain or head trauma  ekg changes c/w LVH  trop 71  bnp 5093        Problem/Recommendation - 1:  ·  Problem: Hypertensive urgency.   somewhat improved since yesterday / hemodynamically stable without chest pain or sob   continue  hydralazine 100mg q8h to slowly reduce b/p  Tylenol prn for headache    Problem/Recommendation - 2:  ·  Problem: Elevated troponin./ 71/67/86   ·  Type 2 non stemi from demand ischemia/ and in setting of MICA   Had  some atypical cp prior to admission / none further   Echo  still pending to evaluate LV function  consider ischemic work up pending echo results.    Problem/Recommendation - 3:  ·   Acute renal failure./   creat 2.6 now 2.13   consider renal consult  renal sono pending     D/w Dr Swan

## 2024-02-22 ENCOUNTER — RESULT REVIEW (OUTPATIENT)
Age: 39
End: 2024-02-22

## 2024-02-22 LAB
ALDOST SERPL-MCNC: 35.9 NG/DL — HIGH
ALDOSTERONE / DIRECT RENIN RATIO RESULT: 0.8 RATIO — SIGNIFICANT CHANGE UP
ANION GAP SERPL CALC-SCNC: 15 MMOL/L — SIGNIFICANT CHANGE UP (ref 5–17)
BUN SERPL-MCNC: 26.9 MG/DL — HIGH (ref 8–20)
CALCIUM SERPL-MCNC: 9 MG/DL — SIGNIFICANT CHANGE UP (ref 8.4–10.5)
CHLORIDE SERPL-SCNC: 97 MMOL/L — SIGNIFICANT CHANGE UP (ref 96–108)
CO2 SERPL-SCNC: 23 MMOL/L — SIGNIFICANT CHANGE UP (ref 22–29)
CREAT SERPL-MCNC: 2.45 MG/DL — HIGH (ref 0.5–1.3)
EGFR: 34 ML/MIN/1.73M2 — LOW
GLUCOSE SERPL-MCNC: 108 MG/DL — HIGH (ref 70–99)
HCT VFR BLD CALC: 35 % — LOW (ref 39–50)
HGB BLD-MCNC: 11.2 G/DL — LOW (ref 13–17)
MCHC RBC-ENTMCNC: 26.2 PG — LOW (ref 27–34)
MCHC RBC-ENTMCNC: 32 GM/DL — SIGNIFICANT CHANGE UP (ref 32–36)
MCV RBC AUTO: 82 FL — SIGNIFICANT CHANGE UP (ref 80–100)
PLATELET # BLD AUTO: 454 K/UL — HIGH (ref 150–400)
POTASSIUM SERPL-MCNC: 3.5 MMOL/L — SIGNIFICANT CHANGE UP (ref 3.5–5.3)
POTASSIUM SERPL-SCNC: 3.5 MMOL/L — SIGNIFICANT CHANGE UP (ref 3.5–5.3)
RBC # BLD: 4.27 M/UL — SIGNIFICANT CHANGE UP (ref 4.2–5.8)
RBC # FLD: 17 % — HIGH (ref 10.3–14.5)
RENIN DIRECT, PLASMA: 47.4 PG/ML — HIGH
SODIUM SERPL-SCNC: 135 MMOL/L — SIGNIFICANT CHANGE UP (ref 135–145)
TROPONIN T, HIGH SENSITIVITY RESULT: 111 NG/L — HIGH (ref 0–51)
UUN UR-MCNC: 1141 MG/DL — SIGNIFICANT CHANGE UP
WBC # BLD: 13.78 K/UL — HIGH (ref 3.8–10.5)
WBC # FLD AUTO: 13.78 K/UL — HIGH (ref 3.8–10.5)

## 2024-02-22 PROCEDURE — 76775 US EXAM ABDO BACK WALL LIM: CPT | Mod: 26

## 2024-02-22 PROCEDURE — 93356 MYOCRD STRAIN IMG SPCKL TRCK: CPT

## 2024-02-22 PROCEDURE — 99232 SBSQ HOSP IP/OBS MODERATE 35: CPT

## 2024-02-22 PROCEDURE — 93306 TTE W/DOPPLER COMPLETE: CPT | Mod: 26

## 2024-02-22 RX ORDER — NIFEDIPINE 30 MG
60 TABLET, EXTENDED RELEASE 24 HR ORAL DAILY
Refills: 0 | Status: DISCONTINUED | OUTPATIENT
Start: 2024-02-22 | End: 2024-02-22

## 2024-02-22 RX ORDER — LABETALOL HCL 100 MG
100 TABLET ORAL
Refills: 0 | Status: DISCONTINUED | OUTPATIENT
Start: 2024-02-22 | End: 2024-02-22

## 2024-02-22 RX ORDER — ASPIRIN/CALCIUM CARB/MAGNESIUM 324 MG
81 TABLET ORAL DAILY
Refills: 0 | Status: DISCONTINUED | OUTPATIENT
Start: 2024-02-22 | End: 2024-02-27

## 2024-02-22 RX ORDER — AMLODIPINE BESYLATE 2.5 MG/1
5 TABLET ORAL ONCE
Refills: 0 | Status: DISCONTINUED | OUTPATIENT
Start: 2024-02-22 | End: 2024-02-22

## 2024-02-22 RX ORDER — AMLODIPINE BESYLATE 2.5 MG/1
10 TABLET ORAL DAILY
Refills: 0 | Status: DISCONTINUED | OUTPATIENT
Start: 2024-02-23 | End: 2024-02-27

## 2024-02-22 RX ORDER — LABETALOL HCL 100 MG
200 TABLET ORAL THREE TIMES A DAY
Refills: 0 | Status: DISCONTINUED | OUTPATIENT
Start: 2024-02-22 | End: 2024-02-24

## 2024-02-22 RX ORDER — AMLODIPINE BESYLATE 2.5 MG/1
5 TABLET ORAL DAILY
Refills: 0 | Status: DISCONTINUED | OUTPATIENT
Start: 2024-02-22 | End: 2024-02-22

## 2024-02-22 RX ORDER — HYDROMORPHONE HYDROCHLORIDE 2 MG/ML
1 INJECTION INTRAMUSCULAR; INTRAVENOUS; SUBCUTANEOUS EVERY 4 HOURS
Refills: 0 | Status: DISCONTINUED | OUTPATIENT
Start: 2024-02-22 | End: 2024-02-27

## 2024-02-22 RX ORDER — FERROUS SULFATE 325(65) MG
325 TABLET ORAL DAILY
Refills: 0 | Status: DISCONTINUED | OUTPATIENT
Start: 2024-02-22 | End: 2024-02-27

## 2024-02-22 RX ADMIN — Medication 10 MILLIGRAM(S): at 19:29

## 2024-02-22 RX ADMIN — Medication 100 MILLIGRAM(S): at 09:43

## 2024-02-22 RX ADMIN — Medication 100 MILLIGRAM(S): at 21:48

## 2024-02-22 RX ADMIN — HYDROMORPHONE HYDROCHLORIDE 1 MILLIGRAM(S): 2 INJECTION INTRAMUSCULAR; INTRAVENOUS; SUBCUTANEOUS at 23:56

## 2024-02-22 RX ADMIN — HYDROMORPHONE HYDROCHLORIDE 0.5 MILLIGRAM(S): 2 INJECTION INTRAMUSCULAR; INTRAVENOUS; SUBCUTANEOUS at 10:11

## 2024-02-22 RX ADMIN — Medication 81 MILLIGRAM(S): at 12:32

## 2024-02-22 RX ADMIN — Medication 100 MILLIGRAM(S): at 05:05

## 2024-02-22 RX ADMIN — Medication 60 MILLIGRAM(S): at 09:43

## 2024-02-22 RX ADMIN — Medication 10 MILLIGRAM(S): at 06:43

## 2024-02-22 RX ADMIN — HYDROMORPHONE HYDROCHLORIDE 1 MILLIGRAM(S): 2 INJECTION INTRAMUSCULAR; INTRAVENOUS; SUBCUTANEOUS at 19:29

## 2024-02-22 RX ADMIN — ONDANSETRON 4 MILLIGRAM(S): 8 TABLET, FILM COATED ORAL at 03:04

## 2024-02-22 RX ADMIN — HEPARIN SODIUM 5000 UNIT(S): 5000 INJECTION INTRAVENOUS; SUBCUTANEOUS at 21:48

## 2024-02-22 RX ADMIN — HEPARIN SODIUM 5000 UNIT(S): 5000 INJECTION INTRAVENOUS; SUBCUTANEOUS at 05:05

## 2024-02-22 RX ADMIN — Medication 200 MILLIGRAM(S): at 13:57

## 2024-02-22 RX ADMIN — Medication 10 MILLIGRAM(S): at 03:04

## 2024-02-22 RX ADMIN — HYDROMORPHONE HYDROCHLORIDE 0.5 MILLIGRAM(S): 2 INJECTION INTRAMUSCULAR; INTRAVENOUS; SUBCUTANEOUS at 06:41

## 2024-02-22 RX ADMIN — Medication 10 MILLIGRAM(S): at 06:53

## 2024-02-22 RX ADMIN — Medication 100 MILLIGRAM(S): at 13:56

## 2024-02-22 RX ADMIN — HEPARIN SODIUM 5000 UNIT(S): 5000 INJECTION INTRAVENOUS; SUBCUTANEOUS at 13:57

## 2024-02-22 RX ADMIN — Medication 10 MILLIGRAM(S): at 14:05

## 2024-02-22 RX ADMIN — OXYCODONE HYDROCHLORIDE 5 MILLIGRAM(S): 5 TABLET ORAL at 06:30

## 2024-02-22 RX ADMIN — HYDROMORPHONE HYDROCHLORIDE 1 MILLIGRAM(S): 2 INJECTION INTRAMUSCULAR; INTRAVENOUS; SUBCUTANEOUS at 20:29

## 2024-02-22 RX ADMIN — HYDROMORPHONE HYDROCHLORIDE 0.5 MILLIGRAM(S): 2 INJECTION INTRAMUSCULAR; INTRAVENOUS; SUBCUTANEOUS at 03:04

## 2024-02-22 RX ADMIN — Medication 650 MILLIGRAM(S): at 05:47

## 2024-02-22 RX ADMIN — Medication 325 MILLIGRAM(S): at 12:32

## 2024-02-22 RX ADMIN — AMLODIPINE BESYLATE 5 MILLIGRAM(S): 2.5 TABLET ORAL at 08:12

## 2024-02-22 RX ADMIN — HYDROMORPHONE HYDROCHLORIDE 0.5 MILLIGRAM(S): 2 INJECTION INTRAMUSCULAR; INTRAVENOUS; SUBCUTANEOUS at 11:10

## 2024-02-22 RX ADMIN — Medication 200 MILLIGRAM(S): at 21:48

## 2024-02-22 NOTE — PATIENT PROFILE ADULT - FALL HARM RISK - UNIVERSAL INTERVENTIONS
Bed in lowest position, wheels locked, appropriate side rails in place/Call bell, personal items and telephone in reach/Instruct patient to call for assistance before getting out of bed or chair/Non-slip footwear when patient is out of bed/[place] to call system/Physically safe environment - no spills, clutter or unnecessary equipment/Purposeful Proactive Rounding/Room/bathroom lighting operational, light cord in reach

## 2024-02-22 NOTE — PATIENT PROFILE ADULT - FUNCTIONAL ASSESSMENT - DAILY ACTIVITY SECTION LABEL
Patient returned from [address] scan      Bay Holley, UNIQUE  09/13/19 1425 .

## 2024-02-22 NOTE — PROGRESS NOTE ADULT - ASSESSMENT
39 y/o male with No past medical hx  has not been to a Dr in many years who presents with 3 weeks of headaches, dizziness and vomiting.    Patients symptoms persisted today and he could not take it any longer and he presented to the ER.   On evaluation he was found to have a b/p of 227/147.  We are being asked to see him for b/p control.    He states he does not get any chest pain when he walks but did get pain in his chest last night when he went to the bathroom but it was on inspiration and   he has had not chest pain today.  He denies any back pain or head trauma  ekg changes c/w LVH  trop 71  bnp [id number]        Problem/Recommendation - 1:  ·  Problem: Hypertensive urgency.   remains elevated / hemodynamically stable without chest pain or sob   continue  hydralazine 100mg q8h / labatelol and procardia started today   Tylenol prn for headache  MRI negative / pt with persistent headaches /   -consider neuro eval     Problem/Recommendation - 2:  ·  Problem: Elevated troponin./ 71/67/86   ·  Type 2 non stemi from demand ischemia/ and in setting of MICA   Had  some atypical cp prior to admission / none further   Echo  still pending to evaluate LV function  consider ischemic work up pending echo results.    Problem/Recommendation - 3:  ·   Acute renal failure./   creat stable at 2.4     renal consult noted   renal sono pending     D/w Dr aponte  39 y/o male with No past medical hx  has not been to a Dr in many years who presents with 3 weeks of headaches, dizziness and vomiting.    Patients symptoms persisted today and he could not take it any longer and he presented to the ER.   On evaluation he was found to have a b/p of 227/147.  We are being asked to see him for b/p control.    He states he does not get any chest pain when he walks but did get pain in his chest last night when he went to the bathroom but it was on inspiration and   he has had not chest pain today.  He denies any back pain or head trauma  ekg changes c/w LVH  trop 71  bnp 5093        Problem/Recommendation - 1:  ·  Problem: Hypertensive urgency.   remains elevated / hemodynamically stable without chest pain or sob   continue  hydralazine 100mg q8h / labatelol and amlodipine  started today   Tylenol prn for headache  MRI negative / pt with persistent headaches /   -consider neuro eval     Problem/Recommendation - 2:  ·  Problem: Elevated troponin./ 71/67/86   ·  Type 2 non stemi from demand ischemia/ and in setting of MICA   Had  some atypical cp prior to admission / none further   Echo  still pending to evaluate LV function  consider ischemic work up pending echo results.    Problem/Recommendation - 3:  ·   Acute renal failure./   creat stable at 2.4     renal consult noted   renal sono pending     D/w Dr aponte  37 y/o male with No past medical hx  has not been to a Dr in many years who presents with 3 weeks of headaches, dizziness and vomiting.    Patients symptoms persisted today and he could not take it any longer and he presented to the ER.   On evaluation he was found to have a b/p of 227/147.  We are being asked to see him for b/p control.    He states he does not get any chest pain when he walks but did get pain in his chest last night when he went to the bathroom but it was on inspiration and   he has had not chest pain today.  He denies any back pain or head trauma  ekg changes c/w LVH  trop 71  bnp 5093        Problem/Recommendation - 1:  ·  Problem: Hypertensive urgency.   remains elevated / hemodynamically stable without chest pain or sob   continue  hydralazine 100mg q8h / labatelol and amlodipine  started today   Tylenol prn for headache  MRI negative / pt with persistent headaches /   -consider neuro eval     Problem/Recommendation - 2:  ·  Problem: Elevated troponin./ 71/67/86   ·  Type 2 non stemi from demand ischemia/ and in setting of MICA   Had  some atypical cp prior to admission / none further   Echo  still pending to evaluate LV function  consider ischemic work up pending echo results.    Problem/Recommendation - 3:  ·   Acute renal failure./   creat stable at 2.4     renal consult noted   renal sono pending     D/w Dr armstrong   1500:   Addemdum :  < from: TTE W or WO Ultrasound Enhancing Agent (02.22.24 @ 08:41) >  1. The left atrium is mildly dilated.   2. Severe left ventricular hypertrophy.   3. Left ventricular systolic function is moderately decreased with an ejection fraction visually estimated at 40 to 45 %.   4. Significantly abnormal LV strain with sparing of the LV apex. With speckled pattern of the LV and LVH. Findings are suggestive of cardiac amyloidosis. GLS - 6.7%.   5. There is severe (grade 3) left ventricular diastolic dysfunction, with elevated filling pressure.   6. The right atrium is mildly dilated.   7. Normal right ventricular cavity size, with increasedwall thickness, and mildly reduced systolic function.   8. Mild tricuspid regurgitation.   9. Estimated pulmonary artery systolic pressure is 61 mmHg, consistent with severe pulmonary hypertension.  10. No pericardial effusion seen.    Echo results as above discussed with Dr Armstrong  amyloid labs ordered and Nuc / PYP ordered for am   will discuss ischemic work up with [facility] with interventional team  post PYP   -will also have HF team see after results of above

## 2024-02-22 NOTE — PROGRESS NOTE ADULT - ATTENDING COMMENTS
-    	  38M chronic smoker with No past medical hx  has not been to a Dr in many years and currently living in his car working as [employer], chronic smoker,  presents with worsening headaches for 2 weeks with also intermittent exertional dyspnea x1 year, found with severely elevated /142,   lab work also significant for Cr. 2.6 (GFR 31), hs=Tito 71, pBNP 5K and Hb 10, EKG with diffuse T-wave inversion and LVH, CT head no acute pathology,  CXR with prominent cardiac silhouette, was given IVP hydralazine, labetalol and Lasix in the ER, cardiology consulted for evaluation      Hypertensive urgency.   - continue  hydralazine 100mg q8h  labetalol and CCB started today   - MRI negative, pt with persistent headaches /   - consider neuro eval   - Cr. 2.6--> 2.1, renal artery Duplex to exclude stenosis and nephrology evaluation, at risk for progression to early onset ESRD   - sent off renin/chintan and metanephrine level for secondary workup    Elevated troponin  - 71, 67, 86   - Type 2 NSTEMI from demand ischemia in setting of MICA   - TTE Pending  - will consider ischemic work up pending echo results.    MICA  - creat 2.4  (Unknown baseline)  - Renal follows

## 2024-02-22 NOTE — PROGRESS NOTE ADULT - ASSESSMENT
38M with no PMHX or recent medical care, undomiciled currently living in his car working in [employer], Tobacco Abuse/Chronic Smoker presents to [facility] ER c/o worsening HA for past 2 weeks admitted for HA 2/2 HTN Urgency c/b Elevated Troponin and ARF v CKD.    #HA 2/2 HTN Urgency c/b Elevated Troponin -- uncontrolled  -CTH WO negative, given persistent headache, will check MR brain  -Pain regimen  -EKG NSR LVH Diffuse TW changes  -Admit to Tele  -TTE pending  -Renin/Aldosterone levels, urine metanephrine serum metanephrine pending  -TSH WNL  -renal artery duplex pending  -trend trop to peak  -Appears euvolemic diuretic unnecessary per Cardio  -Hydralazine 100mg PO q8 + Labetalol TID + Amlodipine   -Cardio Consulted ([facility]) appreciate reccs    ARF v CKD  -Likely 2/2 hypertensive renal disease   -CPK WNL  -Renal Artery Duplex/US pending  -Nephro Consulted, luisa recs    Normocytic Anemia likely 2/2 AOCKD  -B12/Folate normal  -Ferritin/Iron Studies consistent with chronic disease  -Trend CBC    Tobacco Abuse  - Cessation. Nicotine Patch PRN withdrawal    Undomiciled  -Declined SW consultation    Dispo: pending TTE, cards recs, BP control and headache resolution    38M with no PMHX or recent medical care, undomiciled currently living in his car working in [employer], Tobacco Abuse/Chronic Smoker presents to [facility] ER c/o worsening HA for past 2 weeks admitted for HA 2/2 HTN Urgency c/b Elevated Troponin and ARF v CKD.    #HA 2/2 HTN Urgency c/b Elevated Troponin -- uncontrolled  -CTH WO negative  -MR brain without acute pathologies  -Pain regimen  -EKG NSR LVH Diffuse TW changes  -Admit to Tele  -TTE pending  -Renin/Aldosterone levels, urine metanephrine serum metanephrine pending  -TSH WNL  -renal artery duplex pending  -trend trop to peak  -Appears euvolemic diuretic unnecessary per Cardio  -Hydralazine 100mg PO q8 + Labetalol TID + Amlodipine   -Cardio Consulted ([facility]) appreciate reccs    ARF v CKD  -Likely 2/2 hypertensive renal disease   -CPK WNL  -Renal Artery Duplex/US pending  -Nephro Consulted, luisa recs    Normocytic Anemia likely 2/2 AOCKD  -B12/Folate normal  -ferrous sulfate QD  -Trend CBC    Tobacco Abuse  - Cessation. Nicotine Patch PRN withdrawal    Undomiciled  -Declined SW consultation    Dispo: pending TTE, cards recs, BP control and headache resolution

## 2024-02-22 NOTE — PROGRESS NOTE ADULT - SUBJECTIVE AND OBJECTIVE BOX
Aletha Olivera M.D.    Patient is a 38y old  Male who presents with a chief complaint of HTN Urgency, Elevated Trop, ARF v CKD, Anemia (22 Feb 2024 09:36)      SUBJECTIVE / OVERNIGHT EVENTS: still with headache and nausea.     Patient denies chest pain, SOB, abd pain, fever, chills, dysuria or any other complaints. All remainder ROS negative.     MEDICATIONS  (STANDING):  aspirin  chewable 81 milliGRAM(s) Oral daily  heparin   Injectable 5000 Unit(s) SubCutaneous every 8 hours  hydrALAZINE 100 milliGRAM(s) Oral every 8 hours  labetalol 200 milliGRAM(s) Oral three times a day    MEDICATIONS  (PRN):  acetaminophen     Tablet .. 650 milliGRAM(s) Oral every 6 hours PRN Temp greater or equal to 38C (100.4F), Mild Pain (1 - 3)  aluminum hydroxide/magnesium hydroxide/simethicone Suspension 30 milliLiter(s) Oral every 4 hours PRN Dyspepsia  HYDROmorphone  Injectable 1 milliGRAM(s) IV Push every 4 hours PRN breathrough pain  labetalol Injectable 10 milliGRAM(s) IV Push every 4 hours PRN SBP >170  melatonin 3 milliGRAM(s) Oral at bedtime PRN Insomnia  metoclopramide Injectable 10 milliGRAM(s) IV Push three times a day PRN emesis  ondansetron Injectable 4 milliGRAM(s) IV Push every 8 hours PRN Nausea and/or Vomiting  oxyCODONE    IR 5 milliGRAM(s) Oral three times a day PRN Moderate Pain (4 - 6) and Severe Pain (7-10)      I&O's Summary      PHYSICAL EXAM:  Vital Signs Last 24 Hrs  T(C): 36.5 (22 Feb 2024 07:45), Max: 37.1 (22 Feb 2024 00:17)  T(F): 97.7 (22 Feb 2024 07:45), Max: 98.8 (22 Feb 2024 00:17)  HR: 90 (22 Feb 2024 10:17) (80 - 100)  BP: 199/122 (22 Feb 2024 10:17) (152/86 - 220/123)  BP(mean): 148 (22 Feb 2024 10:17) (148 - 148)  RR: 18 (22 Feb 2024 07:45) (16 - 18)  SpO2: 95% (22 Feb 2024 07:45) (95% - 98%)    Parameters below as of 22 Feb 2024 07:45  Patient On (Oxygen Delivery Method): room air      CONSTITUTIONAL: NAD, well-groomed  RESPIRATORY: Normal respiratory effort; lungs are clear to auscultation bilaterally  CARDIOVASCULAR: Regular rate and rhythm; No lower extremity edema  ABDOMEN: Nontender to palpation, normoactive bowel sounds  PSYCH: A+O x3; affect appropriate    LABS:                        11.2   13.78 )-----------( 454      ( 22 Feb 2024 05:21 )             35.0     02-22    135  |  97  |  26.9<H>  ----------------------------<  108<H>  3.5   |  23.0  |  2.45<H>    Ca    9.0      22 Feb 2024 05:21  Phos  2.8     02-21  Mg     2.3     02-21    TPro  6.1<L>  /  Alb  3.7  /  TBili  0.5  /  DBili  x   /  AST  43<H>  /  ALT  66<H>  /  AlkPhos  74  02-20    PT/INR - ( 20 Feb 2024 17:05 )   PT: 12.9 sec;   INR: 1.17 ratio         PTT - ( 20 Feb 2024 17:05 )  PTT:33.6 sec  CARDIAC MARKERS ( 20 Feb 2024 17:05 )  x     / x     / 117 U/L / x     / x          Urinalysis Basic - ( 22 Feb 2024 05:21 )    Color: x / Appearance: x / SG: x / pH: x  Gluc: 108 mg/dL / Ketone: x  / Bili: x / Urobili: x   Blood: x / Protein: x / Nitrite: x   Leuk Esterase: x / RBC: x / WBC x   Sq Epi: x / Non Sq Epi: x / Bacteria: x        CAPILLARY BLOOD GLUCOSE          RADIOLOGY & ADDITIONAL TESTS:  Results Reviewed:   Imaging Personally Reviewed:  Electrocardiogram Personally Reviewed:

## 2024-02-22 NOTE — PROGRESS NOTE ADULT - SUBJECTIVE AND OBJECTIVE BOX
[facility]                                                         CARDIOLOGY AT [facility]                                                                  [address] [facility] [id number] [place] [address]- [facility]                                                         Telephone: [phone]. Fax:[phone]                                                                             PROGRESS NOTE    Reason for follow up: htn urgency   Update: pt remains in ED , c/o headache overnight and vomitting at this time   diaphoretic , was given zofran and regaln   dilaudid for headache with some relief   denies complaints of chest pain/sob/dizziness/palps   BP elevated / still awaiting echo -called to expedite    Review of symptoms:   Cardiac:  No chest pain. No dyspnea. No palpitations.  Respiratory: no cough. No dyspnea  Gastrointestinal: No diarrhea. No abdominal pain. No bleeding.   Neuro: No focal neuro complaints.    Vitals:  T(C): 36.5 (02-22-24 @ 07:45), Max: 37.1 (02-22-24 @ 00:17)  HR: 85 (02-22-24 @ 07:45) (80 - 100)  BP: 197/108 (02-22-24 @ 07:45) (152/86 - 220/123)  RR: 18 (02-22-24 @ 07:45) (16 - 18)  SpO2: 95% (02-22-24 @ 07:45) (95% - 98%)  Wt(kg): --  I&O's Summary    Weight (kg): 104.3 (02-20 @ 16:01)    PHYSICAL EXAM:  Appearance: Vomiting and headache   HEENT:  Atraumatic. Normocephalic.  Normal oral mucosa  Neurologic: A & O x 3, no gross focal deficits.  Cardiovascular: RRR S1 S2,  No murmur, no rubs/gallops. No JVD  Respiratory: Lungs clear to auscultation, unlabored   Gastrointestinal:  Soft, Non-tender, + BS  Lower Extremities: 2+ Peripheral Pulses, No clubbing, cyanosis, or edema  Psychiatry: Patient is calm. No agitation.   Skin: warm and dry.    CURRENT CARDIAC MEDICATIONS:  hydrALAZINE 100 milliGRAM(s) Oral every 8 hours  labetalol 100 milliGRAM(s) Oral two times a day  labetalol Injectable 10 milliGRAM(s) IV Push every 4 hours PRN  NIFEdipine XL 60 milliGRAM(s) Oral daily      CURRENT OTHER MEDICATIONS:  acetaminophen     Tablet .. 650 milliGRAM(s) Oral every 6 hours PRN Temp greater or equal to 38C (100.4F), Mild Pain (1 - 3)  HYDROmorphone  Injectable 0.5 milliGRAM(s) IV Push three times a day PRN Breakthrough pain  melatonin 3 milliGRAM(s) Oral at bedtime PRN Insomnia  metoclopramide Injectable 10 milliGRAM(s) IV Push three times a day PRN emesis  ondansetron Injectable 4 milliGRAM(s) IV Push every 8 hours PRN Nausea and/or Vomiting  oxyCODONE    IR 5 milliGRAM(s) Oral three times a day PRN Moderate Pain (4 - 6) and Severe Pain (7-10)  aluminum hydroxide/magnesium hydroxide/simethicone Suspension 30 milliLiter(s) Oral every 4 hours PRN Dyspepsia  aspirin  chewable 81 milliGRAM(s) Oral daily  heparin   Injectable 5000 Unit(s) SubCutaneous every 8 hours      LABS:	 	  ( 20 Feb 2024 17:05 )  Troponin T  X    ,  CPK  117  , CKMB  X    , BNP X                                  11.2   13.78 )-----------( 454      ( 22 Feb 2024 05:21 )             35.0     02-22    135  |  97  |  26.9<H>  ----------------------------<  108<H>  3.5   |  23.0  |  2.45<H>    Ca    9.0      22 Feb 2024 05:21  Phos  2.8     02-21  Mg     2.3     02-21    TPro  6.1<L>  /  Alb  3.7  /  TBili  0.5  /  DBili  x   /  AST  43<H>  /  ALT  66<H>  /  AlkPhos  74  02-20    PT/INR/PTT ( 20 Feb 2024 17:05 )                       :                       :      12.9         :       33.6                  .        .                   .              .           .       1.17        .                                       Lipid Profile: Date: 02-21 @ 08:36  Total cholesterol 161; Direct LDL: --; HDL: 45; Triglycerides:86    HgA1c:   TSH: Thyroid Stimulating Hormone, Serum: 1.23 uIU/mL      TELEMETRY: RSR T  100     DIAGNOSTIC TESTING:  [ ] Echocardiogram: pending   [

## 2024-02-23 DIAGNOSIS — N17.9 ACUTE KIDNEY FAILURE, UNSPECIFIED: ICD-10-CM

## 2024-02-23 DIAGNOSIS — I51.89 OTHER ILL-DEFINED HEART DISEASES: ICD-10-CM

## 2024-02-23 DIAGNOSIS — D50.9 IRON DEFICIENCY ANEMIA, UNSPECIFIED: ICD-10-CM

## 2024-02-23 LAB
A1C WITH ESTIMATED AVERAGE GLUCOSE RESULT: 5.3 % — SIGNIFICANT CHANGE UP (ref 4–5.6)
ANION GAP SERPL CALC-SCNC: 13 MMOL/L — SIGNIFICANT CHANGE UP (ref 5–17)
BUN SERPL-MCNC: 20.3 MG/DL — HIGH (ref 8–20)
CALCIUM SERPL-MCNC: 9 MG/DL — SIGNIFICANT CHANGE UP (ref 8.4–10.5)
CHLORIDE SERPL-SCNC: 95 MMOL/L — LOW (ref 96–108)
CO2 SERPL-SCNC: 26 MMOL/L — SIGNIFICANT CHANGE UP (ref 22–29)
CREAT SERPL-MCNC: 1.96 MG/DL — HIGH (ref 0.5–1.3)
EGFR: 44 ML/MIN/1.73M2 — LOW
ESTIMATED AVERAGE GLUCOSE: 105 MG/DL — SIGNIFICANT CHANGE UP (ref 68–114)
GLUCOSE SERPL-MCNC: 151 MG/DL — HIGH (ref 70–99)
HCT VFR BLD CALC: 33 % — LOW (ref 39–50)
HGB BLD-MCNC: 10.8 G/DL — LOW (ref 13–17)
KAPPA LC SER QL IFE: 2.98 MG/DL — HIGH (ref 0.33–1.94)
KAPPA/LAMBDA FREE LIGHT CHAIN RATIO, SERUM: 1.65 RATIO — SIGNIFICANT CHANGE UP (ref 0.26–1.65)
LAMBDA LC SER QL IFE: 1.81 MG/DL — SIGNIFICANT CHANGE UP (ref 0.57–2.63)
MCHC RBC-ENTMCNC: 26.3 PG — LOW (ref 27–34)
MCHC RBC-ENTMCNC: 32.7 GM/DL — SIGNIFICANT CHANGE UP (ref 32–36)
MCV RBC AUTO: 80.5 FL — SIGNIFICANT CHANGE UP (ref 80–100)
PLATELET # BLD AUTO: 435 K/UL — HIGH (ref 150–400)
POTASSIUM SERPL-MCNC: 3.3 MMOL/L — LOW (ref 3.5–5.3)
POTASSIUM SERPL-SCNC: 3.3 MMOL/L — LOW (ref 3.5–5.3)
RBC # BLD: 4.1 M/UL — LOW (ref 4.2–5.8)
RBC # FLD: 16.7 % — HIGH (ref 10.3–14.5)
SODIUM SERPL-SCNC: 134 MMOL/L — LOW (ref 135–145)
TROPONIN T, HIGH SENSITIVITY RESULT: 125 NG/L — HIGH (ref 0–51)
WBC # BLD: 12.76 K/UL — HIGH (ref 3.8–10.5)
WBC # FLD AUTO: 12.76 K/UL — HIGH (ref 3.8–10.5)

## 2024-02-23 PROCEDURE — 74185 MRA ABD W OR W/O CNTRST: CPT | Mod: 26

## 2024-02-23 PROCEDURE — 99233 SBSQ HOSP IP/OBS HIGH 50: CPT

## 2024-02-23 PROCEDURE — 99223 1ST HOSP IP/OBS HIGH 75: CPT

## 2024-02-23 PROCEDURE — 93010 ELECTROCARDIOGRAM REPORT: CPT

## 2024-02-23 PROCEDURE — 99232 SBSQ HOSP IP/OBS MODERATE 35: CPT

## 2024-02-23 PROCEDURE — 78803 RP LOCLZJ TUM SPECT 1 AREA: CPT | Mod: 26

## 2024-02-23 RX ORDER — POTASSIUM CHLORIDE 20 MEQ
40 PACKET (EA) ORAL ONCE
Refills: 0 | Status: COMPLETED | OUTPATIENT
Start: 2024-02-23 | End: 2024-02-23

## 2024-02-23 RX ORDER — SODIUM CHLORIDE 9 MG/ML
250 INJECTION INTRAMUSCULAR; INTRAVENOUS; SUBCUTANEOUS ONCE
Refills: 0 | Status: DISCONTINUED | OUTPATIENT
Start: 2024-02-23 | End: 2024-02-23

## 2024-02-23 RX ORDER — SODIUM CHLORIDE 9 MG/ML
500 INJECTION INTRAMUSCULAR; INTRAVENOUS; SUBCUTANEOUS
Refills: 0 | Status: DISCONTINUED | OUTPATIENT
Start: 2024-02-23 | End: 2024-02-23

## 2024-02-23 RX ADMIN — Medication 200 MILLIGRAM(S): at 21:59

## 2024-02-23 RX ADMIN — HEPARIN SODIUM 5000 UNIT(S): 5000 INJECTION INTRAVENOUS; SUBCUTANEOUS at 22:00

## 2024-02-23 RX ADMIN — Medication 100 MILLIGRAM(S): at 13:11

## 2024-02-23 RX ADMIN — HYDROMORPHONE HYDROCHLORIDE 1 MILLIGRAM(S): 2 INJECTION INTRAMUSCULAR; INTRAVENOUS; SUBCUTANEOUS at 19:42

## 2024-02-23 RX ADMIN — HYDROMORPHONE HYDROCHLORIDE 1 MILLIGRAM(S): 2 INJECTION INTRAMUSCULAR; INTRAVENOUS; SUBCUTANEOUS at 06:14

## 2024-02-23 RX ADMIN — Medication 100 MILLIGRAM(S): at 05:14

## 2024-02-23 RX ADMIN — HEPARIN SODIUM 5000 UNIT(S): 5000 INJECTION INTRAVENOUS; SUBCUTANEOUS at 05:14

## 2024-02-23 RX ADMIN — Medication 200 MILLIGRAM(S): at 05:14

## 2024-02-23 RX ADMIN — Medication 100 MILLIGRAM(S): at 21:59

## 2024-02-23 RX ADMIN — Medication 10 MILLIGRAM(S): at 20:10

## 2024-02-23 RX ADMIN — Medication 81 MILLIGRAM(S): at 10:35

## 2024-02-23 RX ADMIN — Medication 325 MILLIGRAM(S): at 10:35

## 2024-02-23 RX ADMIN — AMLODIPINE BESYLATE 10 MILLIGRAM(S): 2.5 TABLET ORAL at 05:14

## 2024-02-23 RX ADMIN — HYDROMORPHONE HYDROCHLORIDE 1 MILLIGRAM(S): 2 INJECTION INTRAMUSCULAR; INTRAVENOUS; SUBCUTANEOUS at 05:14

## 2024-02-23 RX ADMIN — HYDROMORPHONE HYDROCHLORIDE 1 MILLIGRAM(S): 2 INJECTION INTRAMUSCULAR; INTRAVENOUS; SUBCUTANEOUS at 00:56

## 2024-02-23 RX ADMIN — Medication 200 MILLIGRAM(S): at 13:11

## 2024-02-23 RX ADMIN — Medication 40 MILLIEQUIVALENT(S): at 10:35

## 2024-02-23 RX ADMIN — ONDANSETRON 4 MILLIGRAM(S): 8 TABLET, FILM COATED ORAL at 09:21

## 2024-02-23 RX ADMIN — HEPARIN SODIUM 5000 UNIT(S): 5000 INJECTION INTRAVENOUS; SUBCUTANEOUS at 13:11

## 2024-02-23 NOTE — PROGRESS NOTE ADULT - ASSESSMENT
38M with no PMHX or recent medical care, undomiciled currently living in his car working in [employer], Tobacco Abuse/Chronic Smoker presents to [facility] ER c/o worsening HA for past 2 weeks admitted for HA 2/2 HTN Urgency c/b Elevated Troponin and ARF v CKD.    #HA 2/2 HTN Urgency c/b Elevated Troponin -- uncontrolled  -CTH WO negative  -MR brain without acute pathologies  -Pain regimen  -EKG NSR LVH Diffuse TW changes  -Admit to Tele  -TTE with EF 40% and significant LV strain  -check NM MRI to r/o cardiac amyloid  -Renin/Aldosterone levels elevated  -Urine metanephrine serum metanephrine pending  -TSH WNL  -d/w radiology, renal artery duplex concerning for possible stenosis, f/u CT abd angio   -trend trop to peak  -Appears euvolemic diuretic unnecessary per Cardio  -Hydralazine 100mg PO q8 + Labetalol TID + Amlodipine   -Cardio Consulted ([facility]) appreciate reccs    ARF v CKD  -Likely 2/2 hypertensive renal disease   -CPK WNL  -Renal Artery Duplex/US result noted above  -Nephro Consulted, luisa recs    Normocytic Anemia likely 2/2 AOCKD  -B12/Folate normal  -ferrous sulfate QD  -Trend CBC    Tobacco Abuse  - Cessation. Nicotine Patch PRN withdrawal    Undomiciled  -Declined SW consultation    Dispo: active, pending cardiac and renal artery stenosis workup

## 2024-02-23 NOTE — PROGRESS NOTE ADULT - SUBJECTIVE AND OBJECTIVE BOX
Subjective: Reporting some improvement in headache, no urinary complaints.  No chest pain.  Review of systems: All systems were reviewed in detail, pertinent positive and negative have been mentioned above, otherwise negative.    Physical Exam:  Gen: no acute distress  MS: alert, conversing normally  Eyes: EOMI, no icterus  HENT: NCAT, MMM  CV: rhythm reg reg, rate normal, no m/g/r, no LE edema  Chest: CTAB, no w/r/r,  Abd: soft, NT, ND  Neuro: moving all 4 limbs spontaneously, no tremor  MSK: normal bulk and tone, no joint swelling  Skin: dry, warm, no rash or jaundice    Vital Signs Last 24 Hrs  T(C): 36.7 (23 Feb 2024 20:07), Max: 37 (23 Feb 2024 16:38)  T(F): 98.1 (23 Feb 2024 20:07), Max: 98.6 (23 Feb 2024 16:38)  HR: 95 (23 Feb 2024 20:07) (84 - 95)  BP: 186/101 (23 Feb 2024 20:07) (144/78 - 186/101)  BP(mean): --  RR: 18 (23 Feb 2024 20:07) (18 - 18)  SpO2: 95% (23 Feb 2024 20:07) (95% - 98%)    Parameters below as of 23 Feb 2024 20:07  Patient On (Oxygen Delivery Method): room air      I&O's Summary    Current Antibiotics:    Other medications:  amLODIPine   Tablet 10 milliGRAM(s) Oral daily  aspirin  chewable 81 milliGRAM(s) Oral daily  ferrous    sulfate 325 milliGRAM(s) Oral daily  heparin   Injectable 5000 Unit(s) SubCutaneous every 8 hours  hydrALAZINE 100 milliGRAM(s) Oral every 8 hours  labetalol 200 milliGRAM(s) Oral three times a day    02-23    134<L>  |  95<L>  |  20.3<H>  ----------------------------<  151<H>  3.3<L>   |  26.0  |  1.96<H>    Ca    9.0      23 Feb 2024 08:20      Creatinine: 1.96 mg/dL (02-23-24 @ 08:20)  Creatinine: 2.45 mg/dL (02-22-24 @ 05:21)  Creatinine: 2.13 mg/dL (02-21-24 @ 08:36)  Creatinine: 2.61 mg/dL (02-20-24 @ 17:05)

## 2024-02-23 NOTE — CONSULT NOTE ADULT - ASSESSMENT
Initial HF c/s: Dr. Hernandez    37 y/o M current smoker with no known PMH or recent medical care, undomiciled currently living in his car working in [employer], who presented to [facility] ER on 2/20 with worsening HA for past 2 weeks. Found to have hypertensive urgency (BP on admission 227/147) and TTE showed LVEF 40-45% with severe LVH. Admission labs also notable for MANJIT, renal insufficiency (unknown baseline), elevated troponin, mildly elevated AST/ALT, and elevated pro-BNP 5093. Utox positive for THC.    Cardiac Studies:  2/23/24: Tc-PYP scan: Not strongly suggestive of  transthyretin cardiac amyloidosis.    2/22/24 TTE: LVEF 40-45%, LVIDd 4.8cm, severe LVH (septum 1.8cm, PWT 2.4cm), grade 3 DD, abnormal GLS (-6.7%) with apical sparing, normal RV size with mild RV dysfunction, mild NANCY, mild TR/LA, severe PH (PASP 61 mmHg).

## 2024-02-23 NOTE — PROGRESS NOTE ADULT - ASSESSMENT
37 y/o male with No past medical hx  has not been to a Dr in many years who presents with 3 weeks of headaches, dizziness and vomiting.    Patients symptoms persisted today and he could not take it any longer and he presented to the ER.   On evaluation he was found to have a b/p of 227/147.  We are being asked to see him for b/p control.    He states he does not get any chest pain when he walks but did get pain in his chest last night when he went to the bathroom but it was on inspiration and   he has had not chest pain today.  He denies any back pain or head trauma  ekg changes c/w LVH  trop 71  bnp 4949

## 2024-02-23 NOTE — PROGRESS NOTE ADULT - ASSESSMENT
38-year-old male with no medical follow-up reports no significant past medical history presented with headache found to have hypertensive urgency, elevated troponin and serum creatinine    Acute kidney injury on chronic kidney disease, NOS  Hypertensive urgency  Anemia, iron deficiency    ·	Baseline serum creatinine unclear, serum creatinine 2.6 on arrival  ·	UA with: No hematuria, UPCR 0.4  ·	Patient likely to have some underlying Chronic hypertensive nephrosclerosis  ·	Acute component could be related to malignant hypertension--->SCr improved to 1.9 today  ·	Unremarkable renal ultrasound  ·	On iron for iron deficiency anemia   ·	On amlodipine, hydralazine And labetalol. Avoid ACE inhibitor, ARB, spironolactone for now. Workup for secondary causes negative as of now.  ·	Reviewed echo and discussed with the patient.  Case was also discussed with cardiology (recommending LHC).  We discussed possibility of contrast-induced nephropathy in detail.  Post detailing of risks: Benefit: Alternative: Option patient wanted to proceed with LHC.  Recommend to start on normal saline at 50 cc midnight before the day of procedure.

## 2024-02-23 NOTE — CONSULT NOTE ADULT - PROBLEM SELECTOR RECOMMENDATION 3
- Unknown baseline but slowly improving  - Avoid nephrotoxins  - Nephrology following
check renal Sono  Likely related to poor perfusion from HTN

## 2024-02-23 NOTE — PROGRESS NOTE ADULT - PROBLEM SELECTOR PLAN 3
.  - appreciate nephrology reccs  - Renal US unremarkable   - US duplex Mid-renal peak systolic velocity to aortic ratio is moderately elevated at 2.58. Normal arterial waveforms

## 2024-02-23 NOTE — PROGRESS NOTE ADULT - SUBJECTIVE AND OBJECTIVE BOX
Aletha Olivera M.D.    Patient is a 38y old  Male who presents with a chief complaint of HTN Urgency, Elevated Trop, ARF v CKD, Anemia (22 Feb 2024 10:46)      SUBJECTIVE / OVERNIGHT EVENTS: reports headache better, sill with some nausea.     Patient denies chest pain, SOB, abd pain, N/V, fever, chills, dysuria or any other complaints. All remainder ROS negative.     MEDICATIONS  (STANDING):  amLODIPine   Tablet 10 milliGRAM(s) Oral daily  aspirin  chewable 81 milliGRAM(s) Oral daily  ferrous    sulfate 325 milliGRAM(s) Oral daily  heparin   Injectable 5000 Unit(s) SubCutaneous every 8 hours  hydrALAZINE 100 milliGRAM(s) Oral every 8 hours  labetalol 200 milliGRAM(s) Oral three times a day    MEDICATIONS  (PRN):  acetaminophen     Tablet .. 650 milliGRAM(s) Oral every 6 hours PRN Temp greater or equal to 38C (100.4F), Mild Pain (1 - 3)  aluminum hydroxide/magnesium hydroxide/simethicone Suspension 30 milliLiter(s) Oral every 4 hours PRN Dyspepsia  HYDROmorphone  Injectable 1 milliGRAM(s) IV Push every 4 hours PRN breathrough pain  labetalol Injectable 10 milliGRAM(s) IV Push every 4 hours PRN SBP >170  melatonin 3 milliGRAM(s) Oral at bedtime PRN Insomnia  metoclopramide Injectable 10 milliGRAM(s) IV Push three times a day PRN emesis  ondansetron Injectable 4 milliGRAM(s) IV Push every 8 hours PRN Nausea and/or Vomiting  oxyCODONE    IR 5 milliGRAM(s) Oral three times a day PRN Moderate Pain (4 - 6) and Severe Pain (7-10)      I&O's Summary      PHYSICAL EXAM:  Vital Signs Last 24 Hrs  T(C): 36.7 (23 Feb 2024 08:02), Max: 36.9 (23 Feb 2024 04:58)  T(F): 98 (23 Feb 2024 08:02), Max: 98.4 (23 Feb 2024 04:58)  HR: 84 (23 Feb 2024 08:02) (84 - 99)  BP: 144/78 (23 Feb 2024 08:02) (134/82 - 200/112)  BP(mean): 136 (22 Feb 2024 19:30) (100 - 142)  RR: 18 (23 Feb 2024 08:02) (18 - 18)  SpO2: 97% (23 Feb 2024 08:02) (95% - 98%)    Parameters below as of 23 Feb 2024 08:02  Patient On (Oxygen Delivery Method): room air    CONSTITUTIONAL: NAD, well-groomed  RESPIRATORY: Normal respiratory effort; lungs are clear to auscultation bilaterally  CARDIOVASCULAR: Regular rate and rhythm; No lower extremity edema  ABDOMEN: Nontender to palpation, normoactive bowel sounds  PSYCH: A+O x3; affect appropriate  LABS:                        10.8   12.76 )-----------( 435      ( 23 Feb 2024 08:20 )             33.0     02-23    134<L>  |  95<L>  |  20.3<H>  ----------------------------<  151<H>  3.3<L>   |  26.0  |  1.96<H>    Ca    9.0      23 Feb 2024 08:20            Urinalysis Basic - ( 23 Feb 2024 08:20 )    Color: x / Appearance: x / SG: x / pH: x  Gluc: 151 mg/dL / Ketone: x  / Bili: x / Urobili: x   Blood: x / Protein: x / Nitrite: x   Leuk Esterase: x / RBC: x / WBC x   Sq Epi: x / Non Sq Epi: x / Bacteria: x        CAPILLARY BLOOD GLUCOSE          RADIOLOGY & ADDITIONAL TESTS:  Results Reviewed:   Imaging Personally Reviewed:  Electrocardiogram Personally Reviewed:

## 2024-02-23 NOTE — CONSULT NOTE ADULT - PROBLEM SELECTOR RECOMMENDATION 9
Discussed importance of low na diet and regular follow up  Start hydralazine 100mg q8h to slowly reduce b/p  Tylenol prn for headaches  Check tsh
- Etiology: TTE showed LVEF 40-45%, severe LVH, and abnormal GLS with apical sparing. Tc-PYP scan negative for TTR amyloidosis and AL amyloid w/u pending. Will order cardiac MRI to assess for HCM vs. amyloidosis. If suggestive of HCM, will send genetic testing as an outpatient. If AL amyloid w/u is positive, will c/s heme/onc. Will also need ischemic w/u when renal function stabilizes per gen cards.  - Clinically euvolemic w/ warm extremities.   - GDMT: Once renal function stabilizes will plan on switching amlodipine/HDZN to ARB/ARNi and adding MRA. Will also plan on switching labetalol to carvedilol. Patient states he is currently applying for Medicaid which will allow him to get Entresto/SGLT2i.  - Diuretics: Currently euvolemic off diuretics  - Please document strict I&Os

## 2024-02-23 NOTE — PROGRESS NOTE ADULT - PROBLEM SELECTOR PLAN 1
.  - BP better controlled today  - continue hydralazine 100mg q8h, labetalol and amlodipine started today   - still with persistent headache, MRI negative   - consider neuro eval if warranted

## 2024-02-23 NOTE — PROGRESS NOTE ADULT - NS ATTEND AMEND GEN_ALL_CORE FT
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 2.6 (GFR 31), hs=Tito 71, pBNP 5K and Hb 10, EKG with diffuse T-wave inversion and LVH, CT head no acute pathology,  CXR with prominent cardiac silhouette, was given IVP hydralazine, labetalol and Lasix in the ER, cardiology consulted for evaluation      Hypertensive urgency.   - continue  hydralazine 100mg q8h  labetalol and CCB started today   - MRI negative, pt with persistent headaches /   - consider neuro eval   - Cr. 2.6--> 2.1, renal artery Duplex to exclude stenosis and nephrology evaluation, at risk for progression to early onset ESRD   - sent off renin/chintan and metanephrine level for secondary workup    Elevated troponin  Acute HFrEF  - Type 2 NSTEMI from demand ischemia in setting of MICA   - TTE with EF 40 to 45%, G3DD, PASP 61  - Trps 21->67->86->11->125  - PYP can not indicative of TTR amyloid  - will plan to add for LHC/RHC today if patient is agreeable,    MICA  - creat 2.4  (Unknown baseline)  - Renal US unremarkable   - US duplex Mid-renal peak systolic velocity to aortic ratio is moderately elevated at 2.58. Normal arterial waveforms.

## 2024-02-23 NOTE — PROGRESS NOTE ADULT - SUBJECTIVE AND OBJECTIVE BOX
[facility]                                                         CARDIOLOGY AT [facility]                                                                  [address] [facility] [address], [facility] [place] [address]                                                         Telephone: [phone]. Fax:[phone]    INCOMPLETE                                                                             PROGRESS NOTE    Reason for follow up: HTN urgency  Update:       Review of symptoms:   Cardiac:  No chest pain. No dyspnea. No palpitations.  Respiratory: no cough. No dyspnea  Gastrointestinal: No diarrhea. No abdominal pain. No bleeding.   Neuro: No focal neuro complaints.    Vitals:  T(C): 36.7 (02-23-24 @ 12:36), Max: 36.9 (02-23-24 @ 04:58)  HR: 92 (02-23-24 @ 12:36) (84 - 99)  BP: 154/85 (02-23-24 @ 12:36) (134/82 - 200/112)  RR: 18 (02-23-24 @ 12:36) (18 - 18)  SpO2: 97% (02-23-24 @ 12:36) (95% - 98%)  Wt(kg): --  I&O's Summary    Weight (kg): 104.3 (02-20 @ 16:01)    PHYSICAL EXAM:  Appearance: Comfortable. No acute distress  HEENT:  Atraumatic. Normocephalic.  Normal oral mucosa  Neurologic: A & O x 3, no gross focal deficits.  Cardiovascular: RRR S1 S2, No murmur, no rubs/gallops. No JVD  Respiratory: Lungs clear to auscultation, unlabored   Gastrointestinal:  Soft, Non-tender, + BS  Lower Extremities: 2+ Peripheral Pulses, No clubbing, cyanosis, or edema  Psychiatry: Patient is calm. No agitation.   Skin: warm and dry.    CURRENT CARDIAC MEDICATIONS:  amLODIPine   Tablet 10 milliGRAM(s) Oral daily  hydrALAZINE 100 milliGRAM(s) Oral every 8 hours  labetalol 200 milliGRAM(s) Oral three times a day  labetalol Injectable 10 milliGRAM(s) IV Push every 4 hours PRN      CURRENT OTHER MEDICATIONS:  acetaminophen     Tablet .. 650 milliGRAM(s) Oral every 6 hours PRN Temp greater or equal to 38C (100.4F), Mild Pain (1 - 3)  HYDROmorphone  Injectable 1 milliGRAM(s) IV Push every 4 hours PRN breathrough pain  melatonin 3 milliGRAM(s) Oral at bedtime PRN Insomnia  metoclopramide Injectable 10 milliGRAM(s) IV Push three times a day PRN emesis  ondansetron Injectable 4 milliGRAM(s) IV Push every 8 hours PRN Nausea and/or Vomiting  oxyCODONE    IR 5 milliGRAM(s) Oral three times a day PRN Moderate Pain (4 - 6) and Severe Pain (7-10)  aluminum hydroxide/magnesium hydroxide/simethicone Suspension 30 milliLiter(s) Oral every 4 hours PRN Dyspepsia  aspirin  chewable 81 milliGRAM(s) Oral daily  ferrous    sulfate 325 milliGRAM(s) Oral daily  heparin   Injectable 5000 Unit(s) SubCutaneous every 8 hours      LABS:	 	  ( 20 Feb 2024 17:05 )  Troponin T  X    ,  CPK  117  , CKMB  X    , BNP X                                  10.8   12.76 )-----------( 435      ( 23 Feb 2024 08:20 )             33.0     02-23    134<L>  |  95<L>  |  20.3<H>  ----------------------------<  151<H>  3.3<L>   |  26.0  |  1.96<H>    Ca    9.0      23 Feb 2024 08:20      PT/INR/PTT ( 20 Feb 2024 17:05 )                       :                       :      12.9         :       33.6                  .        .                   .              .           .       1.17        .                                       Lipid Profile: Date: 02-21 @ 08:36  Total cholesterol 161; Direct LDL: --; HDL: 45; Triglycerides:86    HgA1c:   TSH: Thyroid Stimulating Hormone, Serum: 1.23 uIU/mL      TELEMETRY:   ECG:    DIAGNOSTIC TESTING:  [ ] Echocardiogram:   [ ]  Catheterization:  [ ] Stress Test:    OTHER: 	                                                                [facility]                                                         CARDIOLOGY AT [facility]                                                                  [address] [facility], [place] [address]                                                         Telephone: [phone]. Fax:[phone]                                                                             PROGRESS NOTE    Reason for follow up: HTN urgency  Update: amyloid study not strongly suggestive of TTR amyloid. Will discuss with patient for RHC/LHC today.      Review of symptoms:   Cardiac:  No chest pain. No dyspnea. No palpitations.  Respiratory: no cough. No dyspnea  Gastrointestinal: No diarrhea. No abdominal pain. No bleeding.   Neuro: No focal neuro complaints.    Vitals:  T(C): 36.7 (02-23-24 @ 12:36), Max: 36.9 (02-23-24 @ 04:58)  HR: 92 (02-23-24 @ 12:36) (84 - 99)  BP: 154/85 (02-23-24 @ 12:36) (134/82 - 200/112)  RR: 18 (02-23-24 @ 12:36) (18 - 18)  SpO2: 97% (02-23-24 @ 12:36) (95% - 98%)  Wt(kg): --  I&O's Summary    Weight (kg): 104.3 (02-20 @ 16:01)    PHYSICAL EXAM:  Appearance: Comfortable. No acute distress  HEENT:  Atraumatic. Normocephalic.  Normal oral mucosa  Neurologic: A & O x 3, no gross focal deficits.  Cardiovascular: RRR S1 S2, No murmur, no rubs/gallops. No JVD  Respiratory: Lungs clear to auscultation, unlabored   Gastrointestinal:  Soft, Non-tender, + BS  Lower Extremities: 2+ Peripheral Pulses, No clubbing, cyanosis, or edema  Psychiatry: Patient is calm. No agitation.   Skin: warm and dry.    CURRENT CARDIAC MEDICATIONS:  amLODIPine   Tablet 10 milliGRAM(s) Oral daily  hydrALAZINE 100 milliGRAM(s) Oral every 8 hours  labetalol 200 milliGRAM(s) Oral three times a day  labetalol Injectable 10 milliGRAM(s) IV Push every 4 hours PRN      CURRENT OTHER MEDICATIONS:  acetaminophen     Tablet .. 650 milliGRAM(s) Oral every 6 hours PRN Temp greater or equal to 38C (100.4F), Mild Pain (1 - 3)  HYDROmorphone  Injectable 1 milliGRAM(s) IV Push every 4 hours PRN breathrough pain  melatonin 3 milliGRAM(s) Oral at bedtime PRN Insomnia  metoclopramide Injectable 10 milliGRAM(s) IV Push three times a day PRN emesis  ondansetron Injectable 4 milliGRAM(s) IV Push every 8 hours PRN Nausea and/or Vomiting  oxyCODONE    IR 5 milliGRAM(s) Oral three times a day PRN Moderate Pain (4 - 6) and Severe Pain (7-10)  aluminum hydroxide/magnesium hydroxide/simethicone Suspension 30 milliLiter(s) Oral every 4 hours PRN Dyspepsia  aspirin  chewable 81 milliGRAM(s) Oral daily  ferrous    sulfate 325 milliGRAM(s) Oral daily  heparin   Injectable 5000 Unit(s) SubCutaneous every 8 hours      LABS:	 	  ( 20 Feb 2024 17:05 )  Troponin T  X    ,  CPK  117  , CKMB  X    , BNP X                                  10.8   12.76 )-----------( 435      ( 23 Feb 2024 08:20 )             33.0     02-23    134<L>  |  95<L>  |  20.3<H>  ----------------------------<  151<H>  3.3<L>   |  26.0  |  1.96<H>    Ca    9.0      23 Feb 2024 08:20      PT/INR/PTT ( 20 Feb 2024 17:05 )                       :                       :      12.9         :       33.6                  .        .                   .              .           .       1.17        .                                       Lipid Profile: Date: 02-21 @ 08:36  Total cholesterol 161; Direct LDL: --; HDL: 45; Triglycerides:86    HgA1c:   TSH: Thyroid Stimulating Hormone, Serum: 1.23 uIU/mL      TELEMETRY: NSR  ECG:    DIAGNOSTIC TESTING:  [ ] Echocardiogram:   < from: TTE W or WO Ultrasound Enhancing Agent (02.22.24 @ 08:41) >    TRANSTHORACIC ECHOCARDIOGRAM REPORT  ________________________________________________________________________________                                      _______       Pt. Name:       JODI ISAAC Study Date:    2/22/2024  MRN:            [id number]       YOB: 1985  Accession #:    [id number]      Age:           38 years  Account#:       [id number]     Gender:        M  Heart Rate:                    Height:        72.00 in (182.88 cm)  Rhythm:                        Weight:  229.00 lb (103.87 kg)  Blood Pressure: 163/92 mmHg    BSA/BMI:       2.26 m² / 31.06 kg/m²  ________________________________________________________________________________________  Referring Physician: [id number] Not Available Doctor  Primary Sonographer: Pamela Dickerson    CPT:               MYOCARDIAL STRAIN IMAGING - 00502.m;ECHO TTE WO CON COMP W                     DOPP - 03367.m  Indication(s):     Hypertensive crisis, unspecified - I16.9  Procedure:         Transthoracic echocardiogram with 2-D, M-mode and complete                     spectral and color flow Doppler. Strain imaging performed for                     evaluation of regional and global myocardial shape and                     dimensions.  Ordering Location: [facility]  Admission Status:  ED    _______________________________________________________________________________________     CONCLUSIONS:      1. The left atrium is mildly dilated.   2. Severe left ventricular hypertrophy.   3. Left ventricular systolic function is moderately decreased with an ejection fraction visually estimated at 40 to 45 %.   4. Significantly abnormal LV strain with sparing of the LV apex. With speckled pattern of the LV and LVH. Findings are suggestive of cardiac amyloidosis. GLS - 6.7%.   5. There is severe (grade 3) left ventricular diastolic dysfunction, with elevated filling pressure.   6. The right atrium is mildly dilated.   7. Normal right ventricular cavity size, with increasedwall thickness, and mildly reduced systolic function.   8. Mild tricuspid regurgitation.   9. Estimated pulmonary artery systolic pressure is 61 mmHg, consistent with severe pulmonary hypertension.  10. No pericardial effusion seen.    < end of copied text >  [ ]  Catheterization:  [ ] Stress Test:    OTHER: 	      < from: NM Amyloidosis SPECT, Single Area Single Day (02.23.24 @ 11:18) >  INTERPRETATION:  RADIOPHARMACEUTICAL: 15.1 mCi Tc-99m-pyrophosphate; i.v.    CLINICAL INFORMATION: 38-year-old male with uncontrolled hypertension,   elevated troponin, echocardiography showed severe left ventricular   hypertrophy with decreased systolic function and decreased LVEF. Patient   is referred to evaluate for cardiac transthyretin amyloidosis.    TECHNIQUE:Approximately 3 hours after radiopharmaceutical administration   SPECT of the chest was obtained.    COMPARISON: No prior myocardial imaging.    FINDINGS: The technical quality of the images is adequate. There is mild   myocardial activity.    Left Ventricular Myocardium to Bone (visual at 3 hrs): Grade: 1  (normal:   0)    Right Ventricular Myocardium to Bone (visual at 3 hrs): Grade: 1    (normal: 0)    IMPRESSION: Cardiac amyloid imaging study is  not strongly suggestive of   transthyretin cardiac amyloidosis.    --- End of Report --    < end of copied text >  < from: US Duplex Kidneys (02.21.24 @ 20:22) >  IMPRESSION:  There is increased peak systolic velocity in the proximal and middle   right renal artery. Mid-renal peak systolic velocity to aortic ratio is   moderately elevated at 2.58. Normal arterial waveforms. Recommend   follow-up with MRA in setting of acute kidney injury.      < end of copied text >

## 2024-02-23 NOTE — PROGRESS NOTE ADULT - PROBLEM SELECTOR PLAN 2
.  - likely NSTEMI type II in the setting of renal dysfunction   - TTE with EF 40 to 45%, G3DD, PASP 61  - Trps 21->67->86->11->125  - PYP can not indicative of TTR amyloid  - will plan to add for LHC/RHC today if patient is agreeable,

## 2024-02-23 NOTE — CONSULT NOTE ADULT - NS ATTEND AMEND GEN_ALL_CORE FT
seen with above,    38M no routine medical care and currently living in his car working as [employer], chronic smoker, presents with worsening headaches for 2 weeks with also intermittent exertional dyspnea x1 year, found with severely elevated /142, lab work also significant for Cr. 2.6 (GFR 31), pBNP 5K and Hb 10, EKG with diffuse T-wave inversion and LVH, CT head no acute pathology, CXR with prominent cardiac silloute, was given IVP hydralazine, labetalol and Lasix in the ER, cardiology consulted for evaluation  -suspect chronically undiagnosed/untreated HTN with hypertensive cardiomyopathy, hypertensive CKD; given neurological symptoms need to gradually lower BP avoid dramatic fluctuation, goal SBP >160s in the next 24hrs, start on hydralazine 100mg TID with holding parameters  -Echo pending likely will have reduced LV EF, appears euvolemic does not need maintenance diuretic   -renal artery Duplex to exclude stenosis and nephrology evaluation, at risk for progression to early onset ESRD   -check renin/chintan and metanephrine level for secondary workup  -normocytic anemia likely due to CKD, check iron panel and ferritin  -he declines social work intervention for being homeless, has a mother in her 60s living in [place] and a 31 years old siblings, denies family h/o CHF        Nehemias Swan DO, [facility]  Faculty Non-Invasive Cardiologist  [phone]
Patient is a 38 year old male with no significant medical history who comes in headaches. He was found to be in hypertensive urgency with systolics above 200s. He was started on treatment for his BP. He subsequently had an echo which showed EF of 40-45% but more significantly he has severe LHH. Strain was done which showed apical sparing. A PYP scan was negative. Free light chains and immunofixation were sent off which have not resulted. On exam he is warm and well perfused without edema. His labwork is signficant for MICA which is improving  While apical sparing can be consistent with amyloid. His PYP scan is negative which would rule out TTR and light chains are in the lab, my suspicion for AL amyloid. Usually these patient are much sicker and if  anything hypotensive. However will await those results  Would recommend a cardiac MRI to eval for infiltrative disease including HCM  Now that his renal function is improving, would add entresto on to his regimen and remove amlodipine  Would transition labetalol to coreg   Eventually will need a [facility] [place] [facility]

## 2024-02-24 DIAGNOSIS — E85.9 AMYLOIDOSIS, UNSPECIFIED: ICD-10-CM

## 2024-02-24 LAB
ANION GAP SERPL CALC-SCNC: 13 MMOL/L — SIGNIFICANT CHANGE UP (ref 5–17)
BUN SERPL-MCNC: 27.2 MG/DL — HIGH (ref 8–20)
CALCIUM SERPL-MCNC: 8.9 MG/DL — SIGNIFICANT CHANGE UP (ref 8.4–10.5)
CHLORIDE SERPL-SCNC: 98 MMOL/L — SIGNIFICANT CHANGE UP (ref 96–108)
CO2 SERPL-SCNC: 24 MMOL/L — SIGNIFICANT CHANGE UP (ref 22–29)
CREAT SERPL-MCNC: 2.39 MG/DL — HIGH (ref 0.5–1.3)
EGFR: 35 ML/MIN/1.73M2 — LOW
GLUCOSE SERPL-MCNC: 106 MG/DL — HIGH (ref 70–99)
HCT VFR BLD CALC: 31.6 % — LOW (ref 39–50)
HGB BLD-MCNC: 10.3 G/DL — LOW (ref 13–17)
MCHC RBC-ENTMCNC: 26.1 PG — LOW (ref 27–34)
MCHC RBC-ENTMCNC: 32.6 GM/DL — SIGNIFICANT CHANGE UP (ref 32–36)
MCV RBC AUTO: 80 FL — SIGNIFICANT CHANGE UP (ref 80–100)
PLATELET # BLD AUTO: 393 K/UL — SIGNIFICANT CHANGE UP (ref 150–400)
POTASSIUM SERPL-MCNC: 3.6 MMOL/L — SIGNIFICANT CHANGE UP (ref 3.5–5.3)
POTASSIUM SERPL-SCNC: 3.6 MMOL/L — SIGNIFICANT CHANGE UP (ref 3.5–5.3)
PROT SERPL-MCNC: 5.9 G/DL — LOW (ref 6–8.3)
RBC # BLD: 3.95 M/UL — LOW (ref 4.2–5.8)
RBC # FLD: 16.5 % — HIGH (ref 10.3–14.5)
SODIUM SERPL-SCNC: 135 MMOL/L — SIGNIFICANT CHANGE UP (ref 135–145)
WBC # BLD: 11.52 K/UL — HIGH (ref 3.8–10.5)
WBC # FLD AUTO: 11.52 K/UL — HIGH (ref 3.8–10.5)

## 2024-02-24 PROCEDURE — 99233 SBSQ HOSP IP/OBS HIGH 50: CPT

## 2024-02-24 PROCEDURE — 99232 SBSQ HOSP IP/OBS MODERATE 35: CPT

## 2024-02-24 RX ORDER — LABETALOL HCL 100 MG
300 TABLET ORAL EVERY 8 HOURS
Refills: 0 | Status: DISCONTINUED | OUTPATIENT
Start: 2024-02-24 | End: 2024-02-27

## 2024-02-24 RX ORDER — SENNA PLUS 8.6 MG/1
2 TABLET ORAL AT BEDTIME
Refills: 0 | Status: DISCONTINUED | OUTPATIENT
Start: 2024-02-24 | End: 2024-02-27

## 2024-02-24 RX ORDER — POLYETHYLENE GLYCOL 3350 17 G/17G
17 POWDER, FOR SOLUTION ORAL DAILY
Refills: 0 | Status: DISCONTINUED | OUTPATIENT
Start: 2024-02-24 | End: 2024-02-27

## 2024-02-24 RX ADMIN — Medication 200 MILLIGRAM(S): at 05:17

## 2024-02-24 RX ADMIN — Medication 325 MILLIGRAM(S): at 09:00

## 2024-02-24 RX ADMIN — Medication 650 MILLIGRAM(S): at 11:15

## 2024-02-24 RX ADMIN — Medication 100 MILLIGRAM(S): at 22:40

## 2024-02-24 RX ADMIN — Medication 100 MILLIGRAM(S): at 13:27

## 2024-02-24 RX ADMIN — Medication 0.5 MILLIGRAM(S): at 12:43

## 2024-02-24 RX ADMIN — HEPARIN SODIUM 5000 UNIT(S): 5000 INJECTION INTRAVENOUS; SUBCUTANEOUS at 05:17

## 2024-02-24 RX ADMIN — ONDANSETRON 4 MILLIGRAM(S): 8 TABLET, FILM COATED ORAL at 10:07

## 2024-02-24 RX ADMIN — Medication 10 MILLIGRAM(S): at 10:08

## 2024-02-24 RX ADMIN — POLYETHYLENE GLYCOL 3350 17 GRAM(S): 17 POWDER, FOR SOLUTION ORAL at 12:43

## 2024-02-24 RX ADMIN — Medication 3 MILLIGRAM(S): at 22:40

## 2024-02-24 RX ADMIN — AMLODIPINE BESYLATE 10 MILLIGRAM(S): 2.5 TABLET ORAL at 05:17

## 2024-02-24 RX ADMIN — Medication 650 MILLIGRAM(S): at 20:30

## 2024-02-24 RX ADMIN — Medication 81 MILLIGRAM(S): at 09:00

## 2024-02-24 RX ADMIN — Medication 650 MILLIGRAM(S): at 12:05

## 2024-02-24 RX ADMIN — HEPARIN SODIUM 5000 UNIT(S): 5000 INJECTION INTRAVENOUS; SUBCUTANEOUS at 22:39

## 2024-02-24 RX ADMIN — HEPARIN SODIUM 5000 UNIT(S): 5000 INJECTION INTRAVENOUS; SUBCUTANEOUS at 13:29

## 2024-02-24 RX ADMIN — SENNA PLUS 2 TABLET(S): 8.6 TABLET ORAL at 22:40

## 2024-02-24 RX ADMIN — Medication 300 MILLIGRAM(S): at 13:28

## 2024-02-24 RX ADMIN — Medication 650 MILLIGRAM(S): at 19:30

## 2024-02-24 RX ADMIN — Medication 100 MILLIGRAM(S): at 05:17

## 2024-02-24 RX ADMIN — Medication 300 MILLIGRAM(S): at 22:45

## 2024-02-24 NOTE — PROGRESS NOTE ADULT - NS ATTEND AMEND GEN_ALL_CORE FT
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 2.6 (GFR 31), hs=Tito 71, pBNP 5K and Hb 10, EKG with diffuse T-wave inversion and LVH, CT head no acute pathology,  CXR with prominent cardiac silhouette, was given IVP hydralazine, labetalol and Lasix in the ER, cardiology consulted for evaluation    Hypertensive urgency.   - continue  hydralazine 100mg q8h  labetalol and CCB started today   - MRI negative, pt with persistent headaches   - still with persistent headache, MRI negative   - consider neuro eval if warranted.    Elevated troponin  Acute HFrEF  - Type 2 NSTEMI from demand ischemia in setting of MICA   - TTE with EF 40 to 45%, G3DD, PASP 61  - Trps 21->67->86->11->125  - PYP can not indicative of TTR amyloid  - will plan to add for LHC/RHC on 2/26 if patient is agreeable and renal function remains stable.    LVH  - PYP scan negative for TTR amyloidosis  - PETTY Kappa 2.98 (elevated), PETTY Lambda 1.81(WNL), [place]/Lambda free light chain ratio 1.65  - pending cMRI on 2/26.    MICA  - creat 2.4  (Unknown baseline)  - Renal US unremarkable   - US duplex Mid-renal peak systolic velocity to aortic ratio is moderately elevated at 2.58. Normal arterial waveforms.  - nephrology recommending NS @ 50ml prior to LHC on 2/26.

## 2024-02-24 NOTE — PROGRESS NOTE ADULT - ASSESSMENT
39 y/o male with No past medical hx  has not been to a Dr in many years who presents with 3 weeks of headaches, dizziness and vomiting.    Patients symptoms persisted today and he could not take it any longer and he presented to the ER.   On evaluation he was found to have a b/p of 227/147.  We are being asked to see him for b/p control.    He states he does not get any chest pain when he walks but did get pain in his chest last night when he went to the bathroom but it was on inspiration and   he has had not chest pain today.  He denies any back pain or head trauma  ekg changes c/w LVH  trop 71  bnp [id number]

## 2024-02-24 NOTE — PROGRESS NOTE ADULT - PROBLEM SELECTOR PLAN 2
.  - likely NSTEMI type II in the setting of renal dysfunction   - TTE with EF 40 to 45%, G3DD, PASP 61  - Trps 21->67->86->11->125  - PYP can not indicative of TTR amyloid  - will plan to add for LHC/RHC on 2/26 if patient is agreeable and renal function remains stable

## 2024-02-24 NOTE — PROGRESS NOTE ADULT - PROBLEM SELECTOR PLAN 3
.  - appreciate nephrology reccs  - Renal US unremarkable   - US duplex Mid-renal peak systolic velocity to aortic ratio is moderately elevated at 2.58. Normal arterial waveforms  - Cr elevated today from previous 1.96->2.39  - nephrology recommending NS @ 50ml prior to [facility] on 2/26

## 2024-02-24 NOTE — PROGRESS NOTE ADULT - SUBJECTIVE AND OBJECTIVE BOX
HOSPITALIST PROGRESS NOTE    JODI ISAAC  [id number]  38yMale    Patient is a 38y old  Male who presents with a chief complaint of HTN Urgency, Elevated Trop, ARF v CKD, Anemia (24 Feb 2024 08:05)      SUBJECTIVE:   Chart reviewed since last visit.  Patient seen and examined at bedside.      OBJECTIVE:  Vital Signs Last 24 Hrs  T(C): 37.1 (24 Feb 2024 10:24), Max: 37.1 (24 Feb 2024 10:24)  T(F): 98.8 (24 Feb 2024 10:24), Max: 98.8 (24 Feb 2024 10:24)  HR: 76 (24 Feb 2024 10:24) (76 - 95)  BP: 139/73 (24 Feb 2024 10:24) (139/73 - 186/101)    RR: 18 (24 Feb 2024 10:24) (18 - 18)  SpO2: 97% (24 Feb 2024 10:24) (95% - 98%)    Parameters below as of 24 Feb 2024 10:24  Patient On (Oxygen Delivery Method): room air        PHYSICAL EXAMINATION  General:   HEENT:    NECK:    CVS:   RESP:    GI:    :   MSK:    CNS:    INTEG:    PSYCH:      MONITOR:  CAPILLARY BLOOD GLUCOSE            I&O's Summary    24 Feb 2024 07:01  -  24 Feb 2024 11:07  --------------------------------------------------------  IN: 400 mL / OUT: 0 mL / NET: 400 mL                            10.3   11.52 )-----------( 393      ( 24 Feb 2024 06:00 )             31.6       02-24    135  |  98  |  27.2<H>  ----------------------------<  106<H>  3.6   |  24.0  |  2.39<H>    Ca    8.9      24 Feb 2024 06:00      Iron Total: 23 ug/dL (02.21.24 @ 08:36)  % Saturation, Iron: 5 % (02.21.24 @ 08:36)   Ferritin: 40 ng/mL (02.21.24 @ 08:36)   Vitamin B12, Serum: 1138 pg/mL (02.21.24 @ 08:36)   Folate, Serum: 14.2: Hemolyzed. Interpret with caution ng/mL (02.21.24 @ 08:36)   Renin Direct, Plasma: 47.4:   Aldosterone, Serum: 35.9:     Pro-Brain Natriuretic Peptide: 5093 pg/mL (02.20.24 @ 17:05)   Troponin T, High Sensitivity Result: 71: *   Troponin T, High Sensitivity Result: 67: *   Troponin T, High Sensitivity Result: 86: *   Troponin T, High Sensitivity Result: 111: *   Troponin T, High Sensitivity Result: 125: *       THC, Urine Qualitative: Positive (02.21.24 @ 17:10)     Urinalysis Basic - ( 24 Feb 2024 06:00 )    Color: x / Appearance: x / SG: x / pH: x  Gluc: 106 mg/dL / Ketone: x  / Bili: x / Urobili: x   Blood: x / Protein: x / Nitrite: x   Leuk Esterase: x / RBC: x / WBC x   Sq Epi: x / Non Sq Epi: x / Bacteria: x        Culture:    TTE:    < from: TTE W or WO Ultrasound Enhancing Agent (02.22.24 @ 08:41) >     CONCLUSIONS:      1. The left atrium is mildly dilated.   2. Severe left ventricular hypertrophy.   3. Left ventricular systolic function is moderately decreased with an ejection fraction visually estimated at 40 to 45 %.   4. Significantly abnormal LV strain with sparing of the LV apex. With speckled pattern of the LV and LVH. Findings are suggestive of cardiac amyloidosis. GLS - 6.7%.   5. There is severe (grade 3) left ventricular diastolic dysfunction, with elevated filling pressure.   6. The right atrium is mildly dilated.   7. Normal right ventricular cavity size, with increasedwall thickness, and mildly reduced systolic function.   8. Mild tricuspid regurgitation.   9. Estimated pulmonary artery systolic pressure is 61 mmHg, consistent with severe pulmonary hypertension.  10. No pericardial effusion seen.    < end of copied text >      < from: NM Amyloidosis SPECT, Single Area Single Day (02.23.24 @ 11:18) >    IMPRESSION: Cardiac amyloid imaging study is  not strongly suggestive of   transthyretin cardiac amyloidosis.    < end of copied text >      RADIOLOGY    < from: CT Head No Cont (02.20.24 @ 16:56) >  IMPRESSION: No acute intracranial hemorrhage, mass effect, or shift of   the midline structures.    < end of copied text >    < from: Xray Chest 1 View-PORTABLE IMMEDIATE (Xray Chest 1 View-PORTABLE IMMEDIATE .) (02.20.24 @ 17:26) >  IMPRESSION: No active disease    < end of copied text >    < from: MR Head No Cont (02.21.24 @ 15:46) >  FINDINGS:    No definitive restricted diffusion.    Corpus callosum, pituitary and pineal regions appear unremarkable with   the exception of partial empty sella. No tonsillar herniation or evidence   of marrow replacement process.    CP angle and IAC regions appear within normal limits, as do the globes,   intraconal regions and major intracranial flow-voids. No paranasal sinus   air-fluid levels or opacification is evident. Paranasal sinus mucosal   thickening with prominence of the nasopharyngeal adenoidal tissue; the   significance of which should be determined on a clinical basis. No   mastoid effusion.    No definitive extra-axial collection, hemorrhage, mass or mass effect. No   specific MR evidence of demyelinating disease. Multiple probable   artifactual foci of FLAIR hyperintensity, without corresponding signal   abnormality on T2-weighted imaging.    < end of copied text >      < from: US Duplex Kidneys (02.21.24 @ 20:22) >  IMPRESSION:  There is increased peak systolic velocity in the proximal and middle   right renal artery. Mid-renal peak systolic velocity to aortic ratio is   moderately elevated at 2.58. Normal arterial waveforms. Recommend   follow-up with MRA in setting of acute kidney injury.    < end of copied text >    < from: US Renal (02.22.24 @ 04:02) >  IMPRESSION: Unremarkable sonographic appearance of the kidneys.    < end of copied text >      < from: MR Angio Abdomen w/ IV Cont (02.23.24 @ 14:03) >  IMPRESSION: No MRA evidence of hemodynamically significant stenosis of   the right renal artery. 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    < end of copied text >      MEDICATIONS  (STANDING):  amLODIPine   Tablet 10 milliGRAM(s) Oral daily  aspirin  chewable 81 milliGRAM(s) Oral daily  ferrous    sulfate 325 milliGRAM(s) Oral daily  heparin   Injectable 5000 Unit(s) SubCutaneous every 8 hours  hydrALAZINE 100 milliGRAM(s) Oral every 8 hours  labetalol 300 milliGRAM(s) Oral every 8 hours      MEDICATIONS  (PRN):  acetaminophen     Tablet .. 650 milliGRAM(s) Oral every 6 hours PRN Temp greater or equal to 38C (100.4F), Mild Pain (1 - 3)  aluminum hydroxide/magnesium hydroxide/simethicone Suspension 30 milliLiter(s) Oral every 4 hours PRN Dyspepsia  HYDROmorphone  Injectable 1 milliGRAM(s) IV Push every 4 hours PRN breathrough pain  labetalol Injectable 10 milliGRAM(s) IV Push every 4 hours PRN SBP >170  melatonin 3 milliGRAM(s) Oral at bedtime PRN Insomnia  metoclopramide Injectable 10 milliGRAM(s) IV Push three times a day PRN emesis  ondansetron Injectable 4 milliGRAM(s) IV Push every 8 hours PRN Nausea and/or Vomiting  oxyCODONE    IR 5 milliGRAM(s) Oral three times a day PRN Moderate Pain (4 - 6) and Severe Pain (7-10)     HOSPITALIST PROGRESS NOTE    JODI ISAAC  [id number]  38yMale    Patient is a 38y old  Male who presents with a chief complaint of HTN Urgency, Elevated Trop, ARF v CKD, Anemia (24 Feb 2024 08:05)      SUBJECTIVE:   Chart reviewed since last visit.  Patient seen and examined at bedside for Hypertension, cardiomyopathy, renal failure.  Continues to complain of frontal headache, dizziness.  Denies any dyspnea, chest pain, palpitations, cough      OBJECTIVE:  Vital Signs Last 24 Hrs  T(C): 37.1 (24 Feb 2024 10:24), Max: 37.1 (24 Feb 2024 10:24)  T(F): 98.8 (24 Feb 2024 10:24), Max: 98.8 (24 Feb 2024 10:24)  HR: 76 (24 Feb 2024 10:24) (76 - 95)  BP: 139/73 (24 Feb 2024 10:24) (139/73 - 186/101)    RR: 18 (24 Feb 2024 10:24) (18 - 18)  SpO2: 97% (24 Feb 2024 10:24) (95% - 98%)    Parameters below as of 24 Feb 2024 10:24  Patient On (Oxygen Delivery Method): room air        PHYSICAL EXAMINATION  General: Lying in bed with lights off.  HEENT:  No facial asymmetry, pupils equal, responsive, reactive to light and accomodation, extraocular movements intact, no facial tenderness  NECK:  Supple  CVS: regular rate and rhythm S1 S2  RESP:  Clear to auscultation  GI:  Soft nondistended nontender BS+  : No suprapubic tenderness  MSK:  No edema, full range of movement  CNS:  Awake, alert, oriented , fluent speech,   INTEG:    PSYCH:      MONITOR:  CAPILLARY BLOOD GLUCOSE            I&O's Summary    24 Feb 2024 07:01  -  24 Feb 2024 11:07  --------------------------------------------------------  IN: 400 mL / OUT: 0 mL / NET: 400 mL                            10.3   11.52 )-----------( 393      ( 24 Feb 2024 06:00 )             31.6       02-24    135  |  98  |  27.2<H>  ----------------------------<  106<H>  3.6   |  24.0  |  2.39<H>    Ca    8.9      24 Feb 2024 06:00      Iron Total: 23 ug/dL (02.21.24 @ 08:36)  % Saturation, Iron: 5 % (02.21.24 @ 08:36)   Ferritin: 40 ng/mL (02.21.24 @ 08:36)   Vitamin B12, Serum: 1138 pg/mL (02.21.24 @ 08:36)   Folate, Serum: 14.2: Hemolyzed. Interpret with caution ng/mL (02.21.24 @ 08:36)   Renin Direct, Plasma: 47.4:   Aldosterone, Serum: 35.9:     Pro-Brain Natriuretic Peptide: 5093 pg/mL (02.20.24 @ 17:05)   Troponin T, High Sensitivity Result: 71: *   Troponin T, High Sensitivity Result: 67: *   Troponin T, High Sensitivity Result: 86: *   Troponin T, High Sensitivity Result: 111: *   Troponin T, High Sensitivity Result: 125: *       THC, Urine Qualitative: Positive (02.21.24 @ 17:10)     Urinalysis Basic - ( 24 Feb 2024 06:00 )    Color: x / Appearance: x / SG: x / pH: x  Gluc: 106 mg/dL / Ketone: x  / Bili: x / Urobili: x   Blood: x / Protein: x / Nitrite: x   Leuk Esterase: x / RBC: x / WBC x   Sq Epi: x / Non Sq Epi: x / Bacteria: x        Culture:    TTE:    < from: TTE W or WO Ultrasound Enhancing Agent (02.22.24 @ 08:41) >     CONCLUSIONS:      1. The left atrium is mildly dilated.   2. Severe left ventricular hypertrophy.   3. Left ventricular systolic function is moderately decreased with an ejection fraction visually estimated at 40 to 45 %.   4. Significantly abnormal LV strain with sparing of the LV apex. With speckled pattern of the LV and LVH. Findings are suggestive of cardiac amyloidosis. GLS - 6.7%.   5. There is severe (grade 3) left ventricular diastolic dysfunction, with elevated filling pressure.   6. The right atrium is mildly dilated.   7. Normal right ventricular cavity size, with increasedwall thickness, and mildly reduced systolic function.   8. Mild tricuspid regurgitation.   9. Estimated pulmonary artery systolic pressure is 61 mmHg, consistent with severe pulmonary hypertension.  10. No pericardial effusion seen.    < end of copied text >      < from: NM Amyloidosis SPECT, Single Area Single Day (02.23.24 @ 11:18) >    IMPRESSION: Cardiac amyloid imaging study is  not strongly suggestive of   transthyretin cardiac amyloidosis.    < end of copied text >      RADIOLOGY    < from: CT Head No Cont (02.20.24 @ 16:56) >  IMPRESSION: No acute intracranial hemorrhage, mass effect, or shift of   the midline structures.    < end of copied text >    < from: Xray Chest 1 View-PORTABLE IMMEDIATE (Xray Chest 1 View-PORTABLE IMMEDIATE .) (02.20.24 @ 17:26) >  IMPRESSION: No active disease    < end of copied text >    < from: MR Head No Cont (02.21.24 @ 15:46) >  FINDINGS:    No definitive restricted diffusion.    Corpus callosum, pituitary and pineal regions appear unremarkable with   the exception of partial empty sella. No tonsillar herniation or evidence   of marrow replacement process.    CP angle and IAC regions appear within normal limits, as do the globes,   intraconal regions and major intracranial flow-voids. No paranasal sinus   air-fluid levels or opacification is evident. Paranasal sinus mucosal   thickening with prominence of the nasopharyngeal adenoidal tissue; the   significance of which should be determined on a clinical basis. No   mastoid effusion.    No definitive extra-axial collection, hemorrhage, mass or mass effect. No   specific MR evidence of demyelinating disease. Multiple probable   artifactual foci of FLAIR hyperintensity, without corresponding signal   abnormality on T2-weighted imaging.    < end of copied text >      < from: US Duplex Kidneys (02.21.24 @ 20:22) >  IMPRESSION:  There is increased peak systolic velocity in the proximal and middle   right renal artery. Mid-renal peak systolic velocity to aortic ratio is   moderately elevated at 2.58. Normal arterial waveforms. Recommend   follow-up with MRA in setting of acute kidney injury.    < end of copied text >    < from: US Renal (02.22.24 @ 04:02) >  IMPRESSION: Unremarkable sonographic appearance of the kidneys.    < end of copied text >      < from: MR Angio Abdomen w/ IV Cont (02.23.24 @ 14:03) >  IMPRESSION: No MRA evidence of hemodynamically significant stenosis of   the right renal artery. 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    < end of copied text >      MEDICATIONS  (STANDING):  amLODIPine   Tablet 10 milliGRAM(s) Oral daily  aspirin  chewable 81 milliGRAM(s) Oral daily  ferrous    sulfate 325 milliGRAM(s) Oral daily  heparin   Injectable 5000 Unit(s) SubCutaneous every 8 hours  hydrALAZINE 100 milliGRAM(s) Oral every 8 hours  labetalol 300 milliGRAM(s) Oral every 8 hours      MEDICATIONS  (PRN):  acetaminophen     Tablet .. 650 milliGRAM(s) Oral every 6 hours PRN Temp greater or equal to 38C (100.4F), Mild Pain (1 - 3)  aluminum hydroxide/magnesium hydroxide/simethicone Suspension 30 milliLiter(s) Oral every 4 hours PRN Dyspepsia  HYDROmorphone  Injectable 1 milliGRAM(s) IV Push every 4 hours PRN breathrough pain  labetalol Injectable 10 milliGRAM(s) IV Push every 4 hours PRN SBP >170  melatonin 3 milliGRAM(s) Oral at bedtime PRN Insomnia  metoclopramide Injectable 10 milliGRAM(s) IV Push three times a day PRN emesis  ondansetron Injectable 4 milliGRAM(s) IV Push every 8 hours PRN Nausea and/or Vomiting  oxyCODONE    IR 5 milliGRAM(s) Oral three times a day PRN Moderate Pain (4 - 6) and Severe Pain (7-10)     HOSPITALIST PROGRESS NOTE    JODI ISAAC  [id number]  38yMale    Patient is a 38y old  Male who presents with a chief complaint of HTN Urgency, Elevated Trop, ARF v CKD, Anemia (24 Feb 2024 08:05)      SUBJECTIVE:   Chart reviewed since last visit.  Patient seen and examined at bedside for Hypertension, cardiomyopathy, renal failure.  Continues to complain of frontal headache, dizziness.  Denies any dyspnea, chest pain, palpitations, cough      OBJECTIVE:  Vital Signs Last 24 Hrs  T(C): 37.1 (24 Feb 2024 10:24), Max: 37.1 (24 Feb 2024 10:24)  T(F): 98.8 (24 Feb 2024 10:24), Max: 98.8 (24 Feb 2024 10:24)  HR: 76 (24 Feb 2024 10:24) (76 - 95)  BP: 139/73 (24 Feb 2024 10:24) (139/73 - 186/101)    RR: 18 (24 Feb 2024 10:24) (18 - 18)  SpO2: 97% (24 Feb 2024 10:24) (95% - 98%)    Parameters below as of 24 Feb 2024 10:24  Patient On (Oxygen Delivery Method): room air        PHYSICAL EXAMINATION  General: Lying in bed with lights off.  HEENT:  No facial asymmetry, pupils equal, responsive, reactive to light and accomodation, extraocular movements intact, no facial tenderness  NECK:  Supple  CVS: regular rate and rhythm S1 S2  RESP:  Clear to auscultation  GI:  Soft nondistended nontender BS+  : No suprapubic tenderness  MSK:  No edema, full range of movement  CNS:  Awake, alert, oriented , fluent speech,   INTEG:  Warm dry skin  PSYCH:  Fair mood    MONITOR:  CAPILLARY BLOOD GLUCOSE            I&O's Summary    24 Feb 2024 07:01  -  24 Feb 2024 11:07  --------------------------------------------------------  IN: 400 mL / OUT: 0 mL / NET: 400 mL                            10.3   11.52 )-----------( 393      ( 24 Feb 2024 06:00 )             31.6       02-24    135  |  98  |  27.2<H>  ----------------------------<  106<H>  3.6   |  24.0  |  2.39<H>    Ca    8.9      24 Feb 2024 06:00      Iron Total: 23 ug/dL (02.21.24 @ 08:36)  % Saturation, Iron: 5 % (02.21.24 @ 08:36)   Ferritin: 40 ng/mL (02.21.24 @ 08:36)   Vitamin B12, Serum: 1138 pg/mL (02.21.24 @ 08:36)   Folate, Serum: 14.2: Hemolyzed. Interpret with caution ng/mL (02.21.24 @ 08:36)   Renin Direct, Plasma: 47.4:   Aldosterone, Serum: 35.9:     Pro-Brain Natriuretic Peptide: 5093 pg/mL (02.20.24 @ 17:05)   Troponin T, High Sensitivity Result: 71: *   Troponin T, High Sensitivity Result: 67: *   Troponin T, High Sensitivity Result: 86: *   Troponin T, High Sensitivity Result: 111: *   Troponin T, High Sensitivity Result: 125: *       THC, Urine Qualitative: Positive (02.21.24 @ 17:10)     Urinalysis Basic - ( 24 Feb 2024 06:00 )    Color: x / Appearance: x / SG: x / pH: x  Gluc: 106 mg/dL / Ketone: x  / Bili: x / Urobili: x   Blood: x / Protein: x / Nitrite: x   Leuk Esterase: x / RBC: x / WBC x   Sq Epi: x / Non Sq Epi: x / Bacteria: x        Culture:    TTE:    < from: TTE W or WO Ultrasound Enhancing Agent (02.22.24 @ 08:41) >     CONCLUSIONS:      1. The left atrium is mildly dilated.   2. Severe left ventricular hypertrophy.   3. Left ventricular systolic function is moderately decreased with an ejection fraction visually estimated at 40 to 45 %.   4. Significantly abnormal LV strain with sparing of the LV apex. With speckled pattern of the LV and LVH. Findings are suggestive of cardiac amyloidosis. GLS - 6.7%.   5. There is severe (grade 3) left ventricular diastolic dysfunction, with elevated filling pressure.   6. The right atrium is mildly dilated.   7. Normal right ventricular cavity size, with increasedwall thickness, and mildly reduced systolic function.   8. Mild tricuspid regurgitation.   9. Estimated pulmonary artery systolic pressure is 61 mmHg, consistent with severe pulmonary hypertension.  10. No pericardial effusion seen.    < end of copied text >      < from: NM Amyloidosis SPECT, Single Area Single Day (02.23.24 @ 11:18) >    IMPRESSION: Cardiac amyloid imaging study is  not strongly suggestive of   transthyretin cardiac amyloidosis.    < end of copied text >      RADIOLOGY    < from: CT Head No Cont (02.20.24 @ 16:56) >  IMPRESSION: No acute intracranial hemorrhage, mass effect, or shift of   the midline structures.    < end of copied text >    < from: Xray Chest 1 View-PORTABLE IMMEDIATE (Xray Chest 1 View-PORTABLE IMMEDIATE .) (02.20.24 @ 17:26) >  IMPRESSION: No active disease    < end of copied text >    < from: MR Head No Cont (02.21.24 @ 15:46) >  FINDINGS:    No definitive restricted diffusion.    Corpus callosum, pituitary and pineal regions appear unremarkable with   the exception of partial empty sella. No tonsillar herniation or evidence   of marrow replacement process.    CP angle and IAC regions appear within normal limits, as do the globes,   intraconal regions and major intracranial flow-voids. No paranasal sinus   air-fluid levels or opacification is evident. Paranasal sinus mucosal   thickening with prominence of the nasopharyngeal adenoidal tissue; the   significance of which should be determined on a clinical basis. No   mastoid effusion.    No definitive extra-axial collection, hemorrhage, mass or mass effect. No   specific MR evidence of demyelinating disease. Multiple probable   artifactual foci of FLAIR hyperintensity, without corresponding signal   abnormality on T2-weighted imaging.    < end of copied text >      < from: US Duplex Kidneys (02.21.24 @ 20:22) >  IMPRESSION:  There is increased peak systolic velocity in the proximal and middle   right renal artery. Mid-renal peak systolic velocity to aortic ratio is   moderately elevated at 2.58. Normal arterial waveforms. Recommend   follow-up with MRA in setting of acute kidney injury.    < end of copied text >    < from: US Renal (02.22.24 @ 04:02) >  IMPRESSION: Unremarkable sonographic appearance of the kidneys.    < end of copied text >      < from: MR Angio Abdomen w/ IV Cont (02.23.24 @ 14:03) >  IMPRESSION: No MRA evidence of hemodynamically significant stenosis of   the right renal artery. 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    < end of copied text >      MEDICATIONS  (STANDING):  amLODIPine   Tablet 10 milliGRAM(s) Oral daily  aspirin  chewable 81 milliGRAM(s) Oral daily  ferrous    sulfate 325 milliGRAM(s) Oral daily  heparin   Injectable 5000 Unit(s) SubCutaneous every 8 hours  hydrALAZINE 100 milliGRAM(s) Oral every 8 hours  labetalol 300 milliGRAM(s) Oral every 8 hours      MEDICATIONS  (PRN):  acetaminophen     Tablet .. 650 milliGRAM(s) Oral every 6 hours PRN Temp greater or equal to 38C (100.4F), Mild Pain (1 - 3)  aluminum hydroxide/magnesium hydroxide/simethicone Suspension 30 milliLiter(s) Oral every 4 hours PRN Dyspepsia  HYDROmorphone  Injectable 1 milliGRAM(s) IV Push every 4 hours PRN breathrough pain  labetalol Injectable 10 milliGRAM(s) IV Push every 4 hours PRN SBP >170  melatonin 3 milliGRAM(s) Oral at bedtime PRN Insomnia  metoclopramide Injectable 10 milliGRAM(s) IV Push three times a day PRN emesis  ondansetron Injectable 4 milliGRAM(s) IV Push every 8 hours PRN Nausea and/or Vomiting  oxyCODONE    IR 5 milliGRAM(s) Oral three times a day PRN Moderate Pain (4 - 6) and Severe Pain (7-10)

## 2024-02-24 NOTE — PROGRESS NOTE ADULT - ASSESSMENT
38M with no PMHX or recent medical care, undomiciled currently living in his car working in [employer], Tobacco Abuse/Chronic Smoker presents to [facility] ER c/o worsening HA for past 2 weeks admitted for HA 2/2 HTN Urgency c/b Elevated Troponin and ARF v CKD.    # Headache due to uncontrolled HTN  # Cardiomyopathy  # Elevated Troponin - plateaued. Renal insufficiency  -CTH WO negative  -MR brain without acute pathology  -Pain regimen; Opioids PO and parenteral. Pharmacy called to add Fioricet  -EKG NSR LVH Diffuse TW changes  -TTE with EF 40% and significant LV strain  - PYP scan not suggestive of cardiac amyloid  -Renin/Aldosterone levels elevated, ratio normal  -Urine metanephrine serum metanephrine pending  -TSH WNL  - Renal artery duplex concerning for possible stenosis, MRa without any stenosis  -trend trop to peak  -Appears euvolemic diuretic unnecessary per Cardio  -Hydralazine 100mg PO q8 + Labetalol TID + Amlodipine   - plan for LHC on 2/26/24    ARF v CKD  -Likely 2/2 hypertensive renal disease   -CPK WNL  -Renal Artery Duplex/US result noted above, MRA without any   -Nephro follow up    Normocytic Anemia likely 2/2 AOCKD  -B12/Folate normal. low ferritin and iron  -ferrous sulfate QD  -Trend CBC    Tobacco Abuse  - Cessation. Nicotine Patch PRN withdrawal    Undomiciled  -Declined SW consultation    Dispo: active, pending cardiac work up

## 2024-02-24 NOTE — PROGRESS NOTE ADULT - SUBJECTIVE AND OBJECTIVE BOX
[facility]                                                         CARDIOLOGY AT [facility]                                                                  [address] [facility], [place]-[address] [place] [address]- [facility]                                                         Telephone: [phone]. Fax:[phone]                                                                             PROGRESS NOTE    Reason for follow up: HTN urgency  Update: remains with headache. creatinine elevated today from previous day. increased labetalol to 300mg PO TID for further BP control. Seen by HF team, plan for cMRI Monday and possible [facility]      Review of symptoms:   Cardiac:  No chest pain. No dyspnea. No palpitations.  Respiratory: no cough. No dyspnea  Gastrointestinal: No diarrhea. No abdominal pain. No bleeding.   Neuro: No focal neuro complaints.    Vitals:  T(C): 36.7 (02-24-24 @ 07:43), Max: 37 (02-23-24 @ 16:38)  HR: 86 (02-24-24 @ 07:43) (86 - 95)  BP: 148/83 (02-24-24 @ 07:43) (148/83 - 186/101)  RR: 18 (02-24-24 @ 07:43) (18 - 18)  SpO2: 95% (02-24-24 @ 07:43) (95% - 98%)  Wt(kg): --  I&O's Summary    Weight (kg): 104.3 (02-20 @ 16:01)    PHYSICAL EXAM:  Appearance: Comfortable. No acute distress  HEENT:  Atraumatic. Normocephalic.  Normal oral mucosa  Neurologic: A & O x 3, no gross focal deficits., +headache  Cardiovascular: RRR S1 S2, No murmur, no rubs/gallops. No JVD  Respiratory: Lungs clear to auscultation, unlabored   Gastrointestinal:  Soft, Non-tender, + BS  Lower Extremities: 2+ Peripheral Pulses, No clubbing, cyanosis, or edema  Psychiatry: Patient is calm. No agitation.   Skin: warm and dry.    CURRENT CARDIAC MEDICATIONS:  amLODIPine   Tablet 10 milliGRAM(s) Oral daily  hydrALAZINE 100 milliGRAM(s) Oral every 8 hours  labetalol 300 milliGRAM(s) Oral every 8 hours  labetalol Injectable 10 milliGRAM(s) IV Push every 4 hours PRN      CURRENT OTHER MEDICATIONS:  acetaminophen     Tablet .. 650 milliGRAM(s) Oral every 6 hours PRN Temp greater or equal to 38C (100.4F), Mild Pain (1 - 3)  HYDROmorphone  Injectable 1 milliGRAM(s) IV Push every 4 hours PRN breathrough pain  melatonin 3 milliGRAM(s) Oral at bedtime PRN Insomnia  metoclopramide Injectable 10 milliGRAM(s) IV Push three times a day PRN emesis  ondansetron Injectable 4 milliGRAM(s) IV Push every 8 hours PRN Nausea and/or Vomiting  oxyCODONE    IR 5 milliGRAM(s) Oral three times a day PRN Moderate Pain (4 - 6) and Severe Pain (7-10)  aluminum hydroxide/magnesium hydroxide/simethicone Suspension 30 milliLiter(s) Oral every 4 hours PRN Dyspepsia  aspirin  chewable 81 milliGRAM(s) Oral daily  ferrous    sulfate 325 milliGRAM(s) Oral daily  heparin   Injectable 5000 Unit(s) SubCutaneous every 8 hours      LABS:	 	  ( 20 Feb 2024 17:05 )  Troponin T  X    ,  CPK  117  , CKMB  X    , BNP X                                  10.3   11.52 )-----------( 393      ( 24 Feb 2024 06:00 )             31.6     02-24    135  |  98  |  27.2<H>  ----------------------------<  106<H>  3.6   |  24.0  |  2.39<H>    Ca    8.9      24 Feb 2024 06:00      PT/INR/PTT ( 20 Feb 2024 17:05 )                       :                       :      12.9         :       33.6                  .        .                   .              .           .       1.17        .                                       Lipid Profile: Date: 02-21 @ 08:36  Total cholesterol 161; Direct LDL: --; HDL: 45; Triglycerides:86    HgA1c:   TSH: Thyroid Stimulating Hormone, Serum: 1.23 uIU/mL      TELEMETRY: NSR  ECG:    DIAGNOSTIC TESTING:  [ ] Echocardiogram:   < from: TTE W or WO Ultrasound Enhancing Agent (02.22.24 @ 08:41) >    TRANSTHORACIC ECHOCARDIOGRAM REPORT  ________________________________________________________________________________                                      _______       Pt. Name:       JODI ISAAC Study Date:    2/22/2024  MRN:            [id number]       YOB: 1985  Accession #:    [id number]      Age:           38 years  Account#:       [id number]     Gender:        M  Heart Rate:                    Height:        72.00 in (182.88 cm)  Rhythm:                        Weight:  229.00 lb (103.87 kg)  Blood Pressure: 163/92 mmHg    BSA/BMI:       2.26 m² / 31.06 kg/m²  ________________________________________________________________________________________  Referring Physician: [id number] Not Available Doctor  Primary Sonographer: Pamela Dickerson    CPT:               MYOCARDIAL STRAIN IMAGING - 76079.m;ECHO TTE WO CON COMP W                     DOPP - 73181.m  Indication(s):     Hypertensive crisis, unspecified - I16.9  Procedure:         Transthoracic echocardiogram with 2-D, M-mode and complete                     spectral and color flow Doppler. Strain imaging performed for                     evaluation of regional and global myocardial shape and                     dimensions.  Ordering Location: [facility]  Admission Status:  ED    _______________________________________________________________________________________     CONCLUSIONS:      1. The left atrium is mildly dilated.   2. Severe left ventricular hypertrophy.   3. Left ventricular systolic function is moderately decreased with an ejection fraction visually estimated at 40 to 45 %.   4. Significantly abnormal LV strain with sparing of the LV apex. With speckled pattern of the LV and LVH. Findings are suggestive of cardiac amyloidosis. GLS - 6.7%.   5. There is severe (grade 3) left ventricular diastolic dysfunction, with elevated filling pressure.   6. The right atrium is mildly dilated.   7. Normal right ventricular cavity size, with increasedwall thickness, and mildly reduced systolic function.   8. Mild tricuspid regurgitation.   9. Estimated pulmonary artery systolic pressure is 61 mmHg, consistent with severe pulmonary hypertension.  10. No pericardial effusion seen.    ___________________________________    < end of copied text >  [ ]  Catheterization:  [ ] Stress Test:    OTHER:

## 2024-02-24 NOTE — PROGRESS NOTE ADULT - PROBLEM SELECTOR PLAN 1
.  - -160s  - continue hydralazine 100mg q8h,  - increase labetalol to 300mg PO TID with hold parameters  - continue amlodipine   - still with persistent headache, MRI negative   - consider neuro eval if warranted

## 2024-02-24 NOTE — PROGRESS NOTE ADULT - PROBLEM SELECTOR PLAN 4
.  - appreciate HF reccs  - PYP scan negative for TTR amyloidosis  - PETTY Kappa 2.98 (elevated), PETTY Lambda 1.81(WNL), [place]/Lambda free light chain ratio 1.65  - pending cMRI on 2/26

## 2024-02-25 DIAGNOSIS — K59.00 CONSTIPATION, UNSPECIFIED: ICD-10-CM

## 2024-02-25 LAB
ANION GAP SERPL CALC-SCNC: 14 MMOL/L — SIGNIFICANT CHANGE UP (ref 5–17)
BUN SERPL-MCNC: 26.3 MG/DL — HIGH (ref 8–20)
CALCIUM SERPL-MCNC: 8.8 MG/DL — SIGNIFICANT CHANGE UP (ref 8.4–10.5)
CHLORIDE SERPL-SCNC: 98 MMOL/L — SIGNIFICANT CHANGE UP (ref 96–108)
CO2 SERPL-SCNC: 23 MMOL/L — SIGNIFICANT CHANGE UP (ref 22–29)
CREAT SERPL-MCNC: 2.39 MG/DL — HIGH (ref 0.5–1.3)
EGFR: 35 ML/MIN/1.73M2 — LOW
GLUCOSE SERPL-MCNC: 96 MG/DL — SIGNIFICANT CHANGE UP (ref 70–99)
HCT VFR BLD CALC: 32.1 % — LOW (ref 39–50)
HGB BLD-MCNC: 10.3 G/DL — LOW (ref 13–17)
MCHC RBC-ENTMCNC: 25.8 PG — LOW (ref 27–34)
MCHC RBC-ENTMCNC: 32.1 GM/DL — SIGNIFICANT CHANGE UP (ref 32–36)
MCV RBC AUTO: 80.3 FL — SIGNIFICANT CHANGE UP (ref 80–100)
PLATELET # BLD AUTO: 428 K/UL — HIGH (ref 150–400)
POTASSIUM SERPL-MCNC: 3.7 MMOL/L — SIGNIFICANT CHANGE UP (ref 3.5–5.3)
POTASSIUM SERPL-SCNC: 3.7 MMOL/L — SIGNIFICANT CHANGE UP (ref 3.5–5.3)
RBC # BLD: 4 M/UL — LOW (ref 4.2–5.8)
RBC # FLD: 16.7 % — HIGH (ref 10.3–14.5)
SODIUM SERPL-SCNC: 135 MMOL/L — SIGNIFICANT CHANGE UP (ref 135–145)
WBC # BLD: 10.5 K/UL — SIGNIFICANT CHANGE UP (ref 3.8–10.5)
WBC # FLD AUTO: 10.5 K/UL — SIGNIFICANT CHANGE UP (ref 3.8–10.5)

## 2024-02-25 PROCEDURE — 99232 SBSQ HOSP IP/OBS MODERATE 35: CPT

## 2024-02-25 RX ORDER — SODIUM CHLORIDE 9 MG/ML
1000 INJECTION INTRAMUSCULAR; INTRAVENOUS; SUBCUTANEOUS
Refills: 0 | Status: DISCONTINUED | OUTPATIENT
Start: 2024-02-26 | End: 2024-02-26

## 2024-02-25 RX ORDER — ALPRAZOLAM 0.25 MG
0.25 TABLET ORAL ONCE
Refills: 0 | Status: DISCONTINUED | OUTPATIENT
Start: 2024-02-25 | End: 2024-02-25

## 2024-02-25 RX ADMIN — Medication 100 MILLIGRAM(S): at 13:45

## 2024-02-25 RX ADMIN — Medication 0.25 MILLIGRAM(S): at 21:07

## 2024-02-25 RX ADMIN — Medication 81 MILLIGRAM(S): at 13:45

## 2024-02-25 RX ADMIN — AMLODIPINE BESYLATE 10 MILLIGRAM(S): 2.5 TABLET ORAL at 05:10

## 2024-02-25 RX ADMIN — Medication 300 MILLIGRAM(S): at 21:08

## 2024-02-25 RX ADMIN — HEPARIN SODIUM 5000 UNIT(S): 5000 INJECTION INTRAVENOUS; SUBCUTANEOUS at 05:10

## 2024-02-25 RX ADMIN — Medication 100 MILLIGRAM(S): at 05:10

## 2024-02-25 RX ADMIN — Medication 325 MILLIGRAM(S): at 13:45

## 2024-02-25 RX ADMIN — SENNA PLUS 2 TABLET(S): 8.6 TABLET ORAL at 21:07

## 2024-02-25 RX ADMIN — Medication 300 MILLIGRAM(S): at 05:10

## 2024-02-25 RX ADMIN — HEPARIN SODIUM 5000 UNIT(S): 5000 INJECTION INTRAVENOUS; SUBCUTANEOUS at 13:45

## 2024-02-25 RX ADMIN — Medication 650 MILLIGRAM(S): at 16:34

## 2024-02-25 RX ADMIN — Medication 300 MILLIGRAM(S): at 13:45

## 2024-02-25 RX ADMIN — Medication 10 MILLIGRAM(S): at 16:33

## 2024-02-25 RX ADMIN — Medication 100 MILLIGRAM(S): at 21:08

## 2024-02-25 RX ADMIN — HEPARIN SODIUM 5000 UNIT(S): 5000 INJECTION INTRAVENOUS; SUBCUTANEOUS at 21:07

## 2024-02-25 NOTE — PROGRESS NOTE ADULT - PROBLEM SELECTOR PLAN 3
- appreciate nephrology reccs  - Renal US unremarkable   - US duplex Mid-renal peak systolic velocity to aortic ratio is moderately elevated at 2.58. Normal arterial waveforms  - Cr elevated today from previous 1.96->2.39  - nephrology recommending NS @ 50ml prior to [facility] on 2/26 - appreciate nephrology reccs  - Renal US unremarkable   - US duplex Mid-renal peak systolic velocity to aortic ratio is moderately elevated at 2.58. Normal arterial waveforms  - Cr elevated today from previous 1.96->2.39  - nephrology recommending NS @ 50ml to be started tonight and to finish NS 4-6 hours post cardiac cath - appreciate nephrology reccs  - Renal US unremarkable   - US duplex Mid-renal peak systolic velocity to aortic ratio is moderately elevated at 2.58. Normal arterial waveforms  - Cr elevated today from previous 1.96->2.39  - nephrology recommending NS @ 50ml to be started tonight and to finish NS 4-6 hours post cardiac cath  - per nephrology, okay for pt to go for both cardiac cath and cardiac MRI, will need IV fluids tonight prior to cath tomorrow.

## 2024-02-25 NOTE — PROGRESS NOTE ADULT - ASSESSMENT
39 y/o male with No past medical hx  has not been to a Dr in many years who presents with 3 weeks of headaches, dizziness and vomiting.  Patients symptoms persisted today and he could not take it any longer and he presented to the ER.   On evaluation he was found to have a b/p of 227/147.  We are being asked to see him for b/p control. He states he does not get any chest pain when he walks but did get pain in his chest last night when he went to the bathroom but it was on inspiration and   he has had not chest pain today.  He denies any back pain or head trauma  ekg changes c/w LVH  trop 71  bnp [id number]

## 2024-02-25 NOTE — PROGRESS NOTE ADULT - ASSESSMENT
38-year-old male with no medical follow-up reports no significant past medical history presented with headache found to have hypertensive urgency, elevated troponin and serum creatinine    Acute kidney injury on chronic kidney disease, NOS vs CKD 3B  Hypertensive urgency  Anemia, iron deficiency    ·	Baseline serum creatinine unclear, serum creatinine 2.6 on arrival   ·	UA with: No hematuria, UPCR 0.4  ·	Patient likely to have some underlying Chronic hypertensive nephrosclerosis  ·	Acute component could be related to malignant hypertension--->SCr Fluctuating between 1.9–2.4  ·	Unremarkable renal ultrasound  ·	On iron for iron deficiency anemia   ·	On amlodipine, hydralazine And labetalol. Avoid ACE inhibitor, ARB, spironolactone for now. Workup for secondary causes negative as of now.  ·	Reviewed echo and discussed with the patient.  Case was also discussed with cardiology (recommending C/RHC).  We discussed possibility of contrast-induced nephropathy in detail earlier.  Post detailing of risks: Benefit: Alternative: Option patient wanted to proceed with it.  Recommend to start on normal saline at 50 cc midnight before the day of procedure and to continue for 4 to 6 hours postprocedure.

## 2024-02-25 NOTE — PROGRESS NOTE ADULT - PROBLEM SELECTOR PLAN 1
- SBP improved, now in the 140s.   - c/w labetalol, hydralazine, and amlodipine.   - still with persistent headache, MRI negative   - consider neuro eval if warranted

## 2024-02-25 NOTE — PROGRESS NOTE ADULT - NS ATTEND AMEND GEN_ALL_CORE FT
-    	  38M chronic smoker with No past medical hx  has not been to a Dr in many years and currently living in his car working as [employer], chronic smoker,  presents with worsening headaches for 2 weeks with also intermittent exertional dyspnea x1 year, found with severely elevated /142,   lab work also significant for Cr. 2.6 (GFR 31), hs=Tito 71, pBNP 5K and Hb 10, EKG with diffuse T-wave inversion and LVH, CT head no acute pathology,  CXR with prominent cardiac silhouette, was given IVP hydralazine, labetalol and Lasix in the ER, cardiology consulted for evaluation    Hypertensive urgency.   - BP improved at 1402 now  - MRI negative, pt with persistent headaches   - still with persistent headache, MRI negative   - consider neuro eval if warranted.    Elevated troponin  Acute HFrEF  - Type 2 NSTEMI from demand ischemia in setting of MICA   - TTE with EF 40 to 45%, G3DD, PASP 61  - Trps 21->67->86->11->125  - PYP can not indicative of TTR amyloid  - will plan to add for LHC/RHC on 2/26 if patient is agreeable and renal function remains stable.  - Keep NPO after midnight tonight.    LVH  - PYP scan negative for TTR amyloidosis  - PETTY Kappa 2.98 (elevated), PETTY Lambda 1.81(WNL), [place]/Lambda free light chain ratio 1.65  - pending cMRI on 2/26.    MICA  - Cr elevated today from previous 1.96->2.39 (Unknown baseline)  - Renal US unremarkable   - US duplex Mid-renal peak systolic velocity to aortic ratio is moderately elevated at 2.58. Normal arterial waveforms.  - nephrology recommending NS @ 50ml prior to LHC on 2/26.  - per nephrology, okay for pt to go for both cardiac cath and cardiac MRI, will need IV fluids tonight prior to cath tomorrow.

## 2024-02-25 NOTE — PROGRESS NOTE ADULT - ASSESSMENT
38M with no PMHX or recent medical care, undomiciled currently living in his car working in [employer], Tobacco Abuse/Chronic Smoker presents to [facility] ER c/o worsening HA for past 2 weeks admitted for HA 2/2 HTN Urgency c/b Elevated Troponin and ARF v CKD.    # Headache due to uncontrolled HTN  # Cardiomyopathy  # Elevated Troponin - plateaued. Renal insufficiency  -CTH WO negative  -MR brain without acute pathology  -Pain regimen; Opioids PO and parenteral. Pharmacy called to add Fioricet  -EKG NSR LVH Diffuse TW changes  -TTE with EF 40% and significant LV strain  - PYP scan not suggestive of cardiac amyloid  - Renin/Aldosterone levels elevated, ratio normal  - Urine metanephrine serum metanephrine pending  - TSH WNL  - Renal artery duplex concerning for possible stenosis, MRa without any stenosis  - trend trop to peak  - Appears euvolemic diuretic unnecessary per Cardio  - Hydralazine 100mg PO q8 + Labetalol TID + Amlodipine   - plan for LHC on 2/26/24; hydrate prior    # ARF v CKD  -Likely 2/2 hypertensive renal disease   -CPK WNL  -Renal Artery Duplex/US result noted above, MRA without any   -Nephro follow up    # Normocytic Anemia likely 2/2 AOCKD  -B12/Folate normal. low ferritin and iron  -ferrous sulfate QD  -Trend CBC    # Tobacco Abuse  - Cessation. Nicotine Patch PRN withdrawal    # Constipation  - bowel regimen    # Undomiciled  -Declined SW consultation    Disposition: Pending cardiac work up. If negative may discharge home with close outpatient follow up    Discussed with CCC

## 2024-02-25 NOTE — PROGRESS NOTE ADULT - PROBLEM SELECTOR PLAN 2
- likely NSTEMI type II in the setting of renal dysfunction   - pt denies chest pain, palpitations, and anginal equivalent symptoms.   - TTE with EF 40 to 45%, G3DD, PASP 61  - Trps 21->67->86->11->125  - PYP can not indicative of TTR amyloid  - will plan to add for LHC/RHC on 2/26 if renal function is stable. Pt is agreeable to LHC/RHC. - likely NSTEMI type II in the setting of renal dysfunction   - pt denies chest pain, palpitations, and anginal equivalent symptoms.   - TTE with EF 40 to 45%, G3DD, PASP 61  - Trps 21->67->86->11->125  - PYP can not indicative of TTR amyloid  - will plan to add for LHC/RHC on 2/26 if renal function is stable. Pt is agreeable to LHC/RHC. Keep NPO after midnight tonight.

## 2024-02-25 NOTE — PROGRESS NOTE ADULT - SUBJECTIVE AND OBJECTIVE BOX
[facility]                                                         CARDIOLOGY AT [facility]                                                                  [address] [facility], [place] [address]                                                         Telephone: [phone]. Fax:[phone]                                                                             PROGRESS NOTE    Reason for follow up: HTN urgency  Initial reason for consult: Hypertensive urgency  Update: Pt is in no acute distress. BP is better controlled.     Review of symptoms:   Cardiac:  No chest pain. No dyspnea. No palpitations.  Respiratory: no cough. No dyspnea  Gastrointestinal: No diarrhea. No abdominal pain. No bleeding.   Neuro: No focal neuro complaints.    Vitals:  T(C): 36.7 (02-25-24 @ 05:25), Max: 37.3 (02-25-24 @ 04:46)  HR: 84 (02-25-24 @ 06:00) (80 - 84)  BP: 144/78 (02-25-24 @ 04:46) (144/78 - 167/87)  RR: 18 (02-25-24 @ 04:46) (18 - 20)  SpO2: 98% (02-25-24 @ 04:46) (93% - 98%)  Wt(kg): --  I&O's Summary    24 Feb 2024 07:01  -  25 Feb 2024 07:00  --------------------------------------------------------  IN: 1350 mL / OUT: 0 mL / NET: 1350 mL      Weight (kg): 104.3 (02-20 @ 16:01)    PHYSICAL EXAM:  Appearance: Comfortable. No acute distress  HEENT:  Atraumatic. Normocephalic.  Normal oral mucosa  Neurologic: A & O x 3, no gross focal deficits.  Cardiovascular: RRR S1 S2, No murmur, no rubs/gallops. No JVD  Respiratory: Lungs clear to auscultation, unlabored   Gastrointestinal:  Soft, Non-tender, + BS  Lower Extremities: 2+ Peripheral Pulses, No clubbing, cyanosis, or edema  Psychiatry: Patient is calm. No agitation.   Skin: warm and dry.    CURRENT CARDIAC MEDICATIONS:  amLODIPine   Tablet 10 milliGRAM(s) Oral daily  hydrALAZINE 100 milliGRAM(s) Oral every 8 hours  labetalol 300 milliGRAM(s) Oral every 8 hours  labetalol Injectable 10 milliGRAM(s) IV Push every 4 hours PRN      CURRENT OTHER MEDICATIONS:  acetaminophen     Tablet .. 650 milliGRAM(s) Oral every 6 hours PRN Temp greater or equal to 38C (100.4F), Mild Pain (1 - 3)  acetaminophen 325 mG/butalbital 50 mG/caffeine 40 mG 2 Tablet(s) Oral every 8 hours PRN Headache  HYDROmorphone  Injectable 1 milliGRAM(s) IV Push every 4 hours PRN breathrough pain  melatonin 3 milliGRAM(s) Oral at bedtime PRN Insomnia  metoclopramide Injectable 10 milliGRAM(s) IV Push three times a day PRN emesis  ondansetron Injectable 4 milliGRAM(s) IV Push every 8 hours PRN Nausea and/or Vomiting  oxyCODONE    IR 5 milliGRAM(s) Oral three times a day PRN Moderate Pain (4 - 6) and Severe Pain (7-10)  aluminum hydroxide/magnesium hydroxide/simethicone Suspension 30 milliLiter(s) Oral every 4 hours PRN Dyspepsia  polyethylene glycol 3350 17 Gram(s) Oral daily PRN Constipation  senna 2 Tablet(s) Oral at bedtime  aspirin  chewable 81 milliGRAM(s) Oral daily  ferrous    sulfate 325 milliGRAM(s) Oral daily  heparin   Injectable 5000 Unit(s) SubCutaneous every 8 hours      LABS:	 	  ( 20 Feb 2024 17:05 )  Troponin T  X    ,  CPK  117  , CKMB  X    , BNP X                                  10.3   10.50 )-----------( 428      ( 25 Feb 2024 05:05 )             32.1     02-25    135  |  98  |  26.3<H>  ----------------------------<  96  3.7   |  23.0  |  2.39<H>    Ca    8.8      25 Feb 2024 05:05    TPro  5.9<L>  /  Alb  x   /  TBili  x   /  DBili  x   /  AST  x   /  ALT  x   /  AlkPhos  x   02-24    PT/INR/PTT ( 20 Feb 2024 17:05 )                       :                       :      12.9         :       33.6                  .        .                   .              .           .       1.17        .                                       Lipid Profile: Date: 02-21 @ 08:36  Total cholesterol 161; Direct LDL: --; HDL: 45; Triglycerides:86    HgA1c: 5.3%   TSH: Thyroid Stimulating Hormone, Serum: 1.23 uIU/mL      TELEMETRY: NSR   ECG: NSR     DIAGNOSTIC TESTING:  [ x] Echocardiogram: < from: TTE W or WO Ultrasound Enhancing Agent (02.22.24 @ 08:41) >      1. The left atrium is mildly dilated.   2. Severe left ventricular hypertrophy.   3. Left ventricular systolic function is moderately decreased with an ejection fraction visually estimated at 40 to 45 %.   4. Significantly abnormal LV strain with sparing of the LV apex. With speckled pattern of the LV and LVH. Findings are suggestive of cardiac amyloidosis. GLS - 6.7%.   5. There is severe (grade 3) left ventricular diastolic dysfunction, with elevated filling pressure.   6. The right atrium is mildly dilated.   7. Normal right ventricular cavity size, with increasedwall thickness, and mildly reduced systolic function.   8. Mild tricuspid regurgitation.   9. Estimated pulmonary artery systolic pressure is 61 mmHg, consistent with severe pulmonary hypertension.  10. No pericardial effusion seen.    < end of copied text >    [ ]  Catheterization:  [ ] Stress Test:    OTHER:

## 2024-02-25 NOTE — PROGRESS NOTE ADULT - SUBJECTIVE AND OBJECTIVE BOX
Subjective: Complaining of constipation, no urinary complaints.  No chest pain.  Review of systems: All systems were reviewed in detail, pertinent positive and negative have been mentioned above, otherwise negative.    Physical Exam:  Gen: no acute distress  MS: alert, conversing normally  Eyes: EOMI, no icterus  HENT: NCAT, MMM  CV: rhythm reg reg, rate normal, no m/g/r, no LE edema  Chest: CTAB, no w/r/r,  Abd: soft, NT, ND  Neuro: moving all 4 limbs spontaneously, no tremor  MSK: normal bulk and tone, no joint swelling  Skin: dry, warm, no rash or jaundice    Vital Signs Last 24 Hrs  T(C): 36.7 (25 Feb 2024 16:53), Max: 37.3 (25 Feb 2024 04:46)  T(F): 98 (25 Feb 2024 16:53), Max: 99.1 (25 Feb 2024 04:46)  HR: 82 (25 Feb 2024 16:53) (82 - 86)  BP: 155/84 (25 Feb 2024 16:53) (144/78 - 167/87)  BP(mean): --  RR: 18 (25 Feb 2024 16:53) (18 - 18)  SpO2: 98% (25 Feb 2024 16:53) (95% - 98%)    Parameters below as of 25 Feb 2024 11:13  Patient On (Oxygen Delivery Method): room air      I&O's Summary    24 Feb 2024 07:01  -  25 Feb 2024 07:00  --------------------------------------------------------  IN: 1350 mL / OUT: 0 mL / NET: 1350 mL      Current Antibiotics:    Other medications:  ALPRAZolam 0.25 milliGRAM(s) Oral once  amLODIPine   Tablet 10 milliGRAM(s) Oral daily  aspirin  chewable 81 milliGRAM(s) Oral daily  ferrous    sulfate 325 milliGRAM(s) Oral daily  heparin   Injectable 5000 Unit(s) SubCutaneous every 8 hours  hydrALAZINE 100 milliGRAM(s) Oral every 8 hours  labetalol 300 milliGRAM(s) Oral every 8 hours  senna 2 Tablet(s) Oral at bedtime    02-25    135  |  98  |  26.3<H>  ----------------------------<  96  3.7   |  23.0  |  2.39<H>    Ca    8.8      25 Feb 2024 05:05    TPro  5.9<L>  /  Alb  x   /  TBili  x   /  DBili  x   /  AST  x   /  ALT  x   /  AlkPhos  x   02-24    Creatinine: 2.39 mg/dL (02-25-24 @ 05:05)  Creatinine: 2.39 mg/dL (02-24-24 @ 06:00)  Creatinine: 1.96 mg/dL (02-23-24 @ 08:20)  Creatinine: 2.45 mg/dL (02-22-24 @ 05:21)  Creatinine: 2.13 mg/dL (02-21-24 @ 08:36)

## 2024-02-25 NOTE — PROGRESS NOTE ADULT - PROBLEM SELECTOR PLAN 4
- appreciate HF reccs  - PYP scan negative for TTR amyloidosis  - PETTY Kappa 2.98 (elevated), PETTY Lambda 1.81(WNL), [place]/Lambda free light chain ratio 1.65  - pending cMRI on 2/26 - appreciate HF reccs  - PYP scan negative for TTR amyloidosis  - PETTY Kappa 2.98 (elevated), PETTY Lambda 1.81(WNL), [place]/Lambda free light chain ratio 1.65  - pending cMRI possibly on 2/26

## 2024-02-26 LAB
ALBUMIN SERPL ELPH-MCNC: 3.6 G/DL — SIGNIFICANT CHANGE UP (ref 3.3–5.2)
ALP SERPL-CCNC: 54 U/L — SIGNIFICANT CHANGE UP (ref 40–120)
ALT FLD-CCNC: 50 U/L — HIGH
ANION GAP SERPL CALC-SCNC: 13 MMOL/L — SIGNIFICANT CHANGE UP (ref 5–17)
AST SERPL-CCNC: 31 U/L — SIGNIFICANT CHANGE UP
BILIRUB DIRECT SERPL-MCNC: 0.1 MG/DL — SIGNIFICANT CHANGE UP (ref 0–0.3)
BILIRUB INDIRECT FLD-MCNC: 0.3 MG/DL — SIGNIFICANT CHANGE UP (ref 0.2–1)
BILIRUB SERPL-MCNC: 0.4 MG/DL — SIGNIFICANT CHANGE UP (ref 0.4–2)
BUN SERPL-MCNC: 30.3 MG/DL — HIGH (ref 8–20)
CALCIUM SERPL-MCNC: 8.9 MG/DL — SIGNIFICANT CHANGE UP (ref 8.4–10.5)
CHLORIDE SERPL-SCNC: 99 MMOL/L — SIGNIFICANT CHANGE UP (ref 96–108)
CO2 SERPL-SCNC: 25 MMOL/L — SIGNIFICANT CHANGE UP (ref 22–29)
CREAT SERPL-MCNC: 2.56 MG/DL — HIGH (ref 0.5–1.3)
EGFR: 32 ML/MIN/1.73M2 — LOW
GLUCOSE SERPL-MCNC: 90 MG/DL — SIGNIFICANT CHANGE UP (ref 70–99)
HCT VFR BLD CALC: 30.3 % — LOW (ref 39–50)
HGB BLD-MCNC: 10 G/DL — LOW (ref 13–17)
MCHC RBC-ENTMCNC: 26.5 PG — LOW (ref 27–34)
MCHC RBC-ENTMCNC: 33 GM/DL — SIGNIFICANT CHANGE UP (ref 32–36)
MCV RBC AUTO: 80.2 FL — SIGNIFICANT CHANGE UP (ref 80–100)
PLATELET # BLD AUTO: 396 K/UL — SIGNIFICANT CHANGE UP (ref 150–400)
POTASSIUM SERPL-MCNC: 3.6 MMOL/L — SIGNIFICANT CHANGE UP (ref 3.5–5.3)
POTASSIUM SERPL-SCNC: 3.6 MMOL/L — SIGNIFICANT CHANGE UP (ref 3.5–5.3)
PROT SERPL-MCNC: 6 G/DL — LOW (ref 6.6–8.7)
RBC # BLD: 3.78 M/UL — LOW (ref 4.2–5.8)
RBC # FLD: 16.8 % — HIGH (ref 10.3–14.5)
SODIUM SERPL-SCNC: 137 MMOL/L — SIGNIFICANT CHANGE UP (ref 135–145)
WBC # BLD: 9.96 K/UL — SIGNIFICANT CHANGE UP (ref 3.8–10.5)
WBC # FLD AUTO: 9.96 K/UL — SIGNIFICANT CHANGE UP (ref 3.8–10.5)

## 2024-02-26 PROCEDURE — 99232 SBSQ HOSP IP/OBS MODERATE 35: CPT

## 2024-02-26 PROCEDURE — 99233 SBSQ HOSP IP/OBS HIGH 50: CPT

## 2024-02-26 PROCEDURE — 99152 MOD SED SAME PHYS/QHP 5/>YRS: CPT

## 2024-02-26 PROCEDURE — 93460 R&L HRT ART/VENTRICLE ANGIO: CPT | Mod: 26

## 2024-02-26 RX ORDER — FUROSEMIDE 40 MG
40 TABLET ORAL ONCE
Refills: 0 | Status: COMPLETED | OUTPATIENT
Start: 2024-02-26 | End: 2024-02-26

## 2024-02-26 RX ORDER — SODIUM CHLORIDE 9 MG/ML
1000 INJECTION INTRAMUSCULAR; INTRAVENOUS; SUBCUTANEOUS
Refills: 0 | Status: DISCONTINUED | OUTPATIENT
Start: 2024-02-26 | End: 2024-02-26

## 2024-02-26 RX ORDER — SODIUM CHLORIDE 9 MG/ML
250 INJECTION INTRAMUSCULAR; INTRAVENOUS; SUBCUTANEOUS ONCE
Refills: 0 | Status: COMPLETED | OUTPATIENT
Start: 2024-02-26 | End: 2024-02-26

## 2024-02-26 RX ORDER — MIDAZOLAM HYDROCHLORIDE 1 MG/ML
1.5 INJECTION, SOLUTION INTRAMUSCULAR; INTRAVENOUS ONCE
Refills: 0 | Status: DISCONTINUED | OUTPATIENT
Start: 2024-02-26 | End: 2024-02-26

## 2024-02-26 RX ORDER — MIDAZOLAM HYDROCHLORIDE 1 MG/ML
0.5 INJECTION, SOLUTION INTRAMUSCULAR; INTRAVENOUS ONCE
Refills: 0 | Status: DISCONTINUED | OUTPATIENT
Start: 2024-02-26 | End: 2024-02-26

## 2024-02-26 RX ADMIN — Medication 650 MILLIGRAM(S): at 23:26

## 2024-02-26 RX ADMIN — Medication 40 MILLIGRAM(S): at 12:51

## 2024-02-26 RX ADMIN — Medication 100 MILLIGRAM(S): at 13:58

## 2024-02-26 RX ADMIN — Medication 100 MILLIGRAM(S): at 21:18

## 2024-02-26 RX ADMIN — Medication 300 MILLIGRAM(S): at 13:57

## 2024-02-26 RX ADMIN — SENNA PLUS 2 TABLET(S): 8.6 TABLET ORAL at 21:18

## 2024-02-26 RX ADMIN — Medication 300 MILLIGRAM(S): at 05:22

## 2024-02-26 RX ADMIN — MIDAZOLAM HYDROCHLORIDE 0.5 MILLIGRAM(S): 1 INJECTION, SOLUTION INTRAMUSCULAR; INTRAVENOUS at 08:45

## 2024-02-26 RX ADMIN — HEPARIN SODIUM 5000 UNIT(S): 5000 INJECTION INTRAVENOUS; SUBCUTANEOUS at 21:18

## 2024-02-26 RX ADMIN — AMLODIPINE BESYLATE 10 MILLIGRAM(S): 2.5 TABLET ORAL at 05:22

## 2024-02-26 RX ADMIN — Medication 650 MILLIGRAM(S): at 17:13

## 2024-02-26 RX ADMIN — SODIUM CHLORIDE 250 MILLILITER(S): 9 INJECTION INTRAMUSCULAR; INTRAVENOUS; SUBCUTANEOUS at 08:23

## 2024-02-26 RX ADMIN — Medication 650 MILLIGRAM(S): at 18:03

## 2024-02-26 RX ADMIN — Medication 81 MILLIGRAM(S): at 07:24

## 2024-02-26 RX ADMIN — Medication 300 MILLIGRAM(S): at 21:18

## 2024-02-26 RX ADMIN — HEPARIN SODIUM 5000 UNIT(S): 5000 INJECTION INTRAVENOUS; SUBCUTANEOUS at 13:57

## 2024-02-26 RX ADMIN — Medication 100 MILLIGRAM(S): at 05:23

## 2024-02-26 RX ADMIN — SODIUM CHLORIDE 50 MILLILITER(S): 9 INJECTION INTRAMUSCULAR; INTRAVENOUS; SUBCUTANEOUS at 01:09

## 2024-02-26 NOTE — PROGRESS NOTE ADULT - SUBJECTIVE AND OBJECTIVE BOX
[facility]                                                         CARDIOLOGY AT [facility]                                                                  [address] [place], [place] [address]                                                         Telephone: [phone]. Fax:[phone]                                                                             PROGRESS NOTE    Reason for follow up: HTN urgency  Initial reason for consult: Hypertensive urgency  Update: NPO since midnight for LHC/RHC today. Patient is seen in cath lab. Patient appears anxious, s/p x1 dose xanax.     Review of symptoms:   Cardiac:  No chest pain. No dyspnea. No palpitations.  Respiratory: no cough. No dyspnea  Gastrointestinal: No diarrhea. No abdominal pain. No bleeding.   Neuro: No focal neuro complaints.    Vitals:  T(C): 36.5 (02-26-24 @ 05:18), Max: 36.9 (02-25-24 @ 11:13)  HR: 81 (02-26-24 @ 07:13) (72 - 86)  BP: 147/78 (02-26-24 @ 07:13) (147/78 - 170/92)  RR: 16 (02-26-24 @ 07:13) (16 - 18)  SpO2: 98% (02-26-24 @ 07:13) (95% - 98%)  Wt(kg): --  I&O's Summary        PHYSICAL EXAM:  Appearance: Comfortable. No acute distress  HEENT:  Atraumatic. Normocephalic.  Normal oral mucosa  Neurologic: A & O x 3, no gross focal deficits.  Cardiovascular: RRR S1 S2, No murmur, no rubs/gallops. No JVD  Respiratory: Lungs clear to auscultation, unlabored   Gastrointestinal:  Soft, Non-tender, + BS  Lower Extremities: 2+ Peripheral Pulses, No clubbing, cyanosis, or edema  Psychiatry: Patient is calm. No agitation.   Skin: warm and dry.    CURRENT CARDIAC MEDICATIONS:  amLODIPine   Tablet 10 milliGRAM(s) Oral daily  hydrALAZINE 100 milliGRAM(s) Oral every 8 hours  labetalol 300 milliGRAM(s) Oral every 8 hours  labetalol Injectable 10 milliGRAM(s) IV Push every 4 hours PRN      CURRENT OTHER MEDICATIONS:  acetaminophen     Tablet .. 650 milliGRAM(s) Oral every 6 hours PRN Temp greater or equal to 38C (100.4F), Mild Pain (1 - 3)  acetaminophen 325 mG/butalbital 50 mG/caffeine 40 mG 2 Tablet(s) Oral every 8 hours PRN Headache  HYDROmorphone  Injectable 1 milliGRAM(s) IV Push every 4 hours PRN breathrough pain  melatonin 3 milliGRAM(s) Oral at bedtime PRN Insomnia  metoclopramide Injectable 10 milliGRAM(s) IV Push three times a day PRN emesis  ondansetron Injectable 4 milliGRAM(s) IV Push every 8 hours PRN Nausea and/or Vomiting  oxyCODONE    IR 5 milliGRAM(s) Oral three times a day PRN Moderate Pain (4 - 6) and Severe Pain (7-10)  aluminum hydroxide/magnesium hydroxide/simethicone Suspension 30 milliLiter(s) Oral every 4 hours PRN Dyspepsia  polyethylene glycol 3350 17 Gram(s) Oral daily PRN Constipation  senna 2 Tablet(s) Oral at bedtime  aspirin  chewable 81 milliGRAM(s) Oral daily  ferrous    sulfate 325 milliGRAM(s) Oral daily  heparin   Injectable 5000 Unit(s) SubCutaneous every 8 hours  sodium chloride 0.9%. 1000 milliLiter(s) (100 mL/Hr) IV Continuous <Continuous>, Stop order after: 6 Hours      LABS:	 	  ( 20 Feb 2024 17:05 )  Troponin T  X    ,  CPK  117  , CKMB  X    , BNP X                                  10.0   9.96  )-----------( 396      ( 26 Feb 2024 05:37 )             30.3     02-26    137  |  99  |  30.3<H>  ----------------------------<  90  3.6   |  25.0  |  2.56<H>    Ca    8.9      26 Feb 2024 05:37    TPro  6.0<L>  /  Alb  3.6  /  TBili  0.4  /  DBili  0.1  /  AST  31  /  ALT  50<H>  /  AlkPhos  54  02-26    PT/INR/PTT ( 20 Feb 2024 17:05 )                       :                       :      12.9         :       33.6                  .        .                   .              .           .       1.17        .                                       Lipid Profile: Date: 02-21 @ 08:36  Total cholesterol 161; Direct LDL: --; HDL: 45; Triglycerides:86    HgA1c: 5.3%   TSH: Thyroid Stimulating Hormone, Serum: 1.23 uIU/mL      TELEMETRY: NSR 80s   ECG: NSR     DIAGNOSTIC TESTING:  [x ] Echocardiogram: < from: TTE W or WO Ultrasound Enhancing Agent (02.22.24 @ 08:41) >   1. The left atrium is mildly dilated.   2. Severe left ventricular hypertrophy.   3. Left ventricular systolic function is moderately decreased with an ejection fraction visually estimated at 40 to 45 %.   4. Significantly abnormal LV strain with sparing of the LV apex. With speckled pattern of the LV and LVH. Findings are suggestive of cardiac amyloidosis. GLS - 6.7%.   5. There is severe (grade 3) left ventricular diastolic dysfunction, with elevated filling pressure.   6. The right atrium is mildly dilated.   7. Normal right ventricular cavity size, with increasedwall thickness, and mildly reduced systolic function.   8. Mild tricuspid regurgitation.   9. Estimated pulmonary artery systolic pressure is 61 mmHg, consistent with severe pulmonary hypertension.  10. No pericardial effusion seen.    < end of copied text >    [ ]  Catheterization:  [ ] Stress Test:    OTHER:

## 2024-02-26 NOTE — PROGRESS NOTE ADULT - PROBLEM SELECTOR PLAN 1
- SBP still elevated, may be due to anxiety at this time. SBP is in the 160-170s now, monitor BP   - c/w labetalol, hydralazine, and amlodipine.   - pt reports no headaches at this time.  - MRI negative   - consider neuro eval if warranted - SBP still elevated, may be due to anxiety at this time. SBP is in the 160-170s now, monitor BP   - pt reports anxiety for the LHC/RHC.   - c/w labetalol, hydralazine, and amlodipine.   - pt reports no headaches at this time.  - MRI negative   - consider neuro eval if warranted

## 2024-02-26 NOTE — CONSULT NOTE ADULT - REASON FOR ADMISSION
HTN Urgency, Elevated Trop, ARF v CKD, Anemia
HTN Urgency, Elevated Trop, ARF v CKD, Anemia
Hypertensive Urgency
HTN Urgency, Elevated Trop, ARF v CKD, Anemia

## 2024-02-26 NOTE — PROGRESS NOTE ADULT - SUBJECTIVE AND OBJECTIVE BOX
CC: Follow up     INTERVAL HPI/OVERNIGHT EVENTS: Patient seen and examined, denies chest pain sob or palpitations. headache improving       Vital Signs Last 24 Hrs  T(C): 36.8 (26 Feb 2024 14:00), Max: 36.8 (26 Feb 2024 14:00)  T(F): 98.2 (26 Feb 2024 14:00), Max: 98.2 (26 Feb 2024 14:00)  HR: 82 (26 Feb 2024 14:00) (72 - 89)  BP: 160/91 (26 Feb 2024 14:00) (147/78 - 176/100)  BP(mean): --  RR: 18 (26 Feb 2024 14:00) (15 - 18)  SpO2: 97% (26 Feb 2024 13:20) (95% - 98%)    Parameters below as of 26 Feb 2024 14:00  Patient On (Oxygen Delivery Method): room air        PHYSICAL EXAM:    GENERAL: NAD, AOX3  HEAD:  Atraumatic, Normocephalic  EYES:  conjunctiva and sclera clear  CHEST/LUNG: Clear to auscultation bilaterally; No rales, rhonchi, wheezing, or rubs  HEART: Regular rate and rhythm; No murmurs, rubs, or gallops  ABDOMEN: Soft, Nontender, Nondistended; Bowel sounds present  EXTREMITIES:  2+ Peripheral Pulses, No clubbing, cyanosis, or edema        MEDICATIONS  (STANDING):  amLODIPine   Tablet 10 milliGRAM(s) Oral daily  aspirin  chewable 81 milliGRAM(s) Oral daily  ferrous    sulfate 325 milliGRAM(s) Oral daily  heparin   Injectable 5000 Unit(s) SubCutaneous every 8 hours  hydrALAZINE 100 milliGRAM(s) Oral every 8 hours  labetalol 300 milliGRAM(s) Oral every 8 hours  senna 2 Tablet(s) Oral at bedtime    MEDICATIONS  (PRN):  acetaminophen     Tablet .. 650 milliGRAM(s) Oral every 6 hours PRN Temp greater or equal to 38C (100.4F), Mild Pain (1 - 3)  acetaminophen 325 mG/butalbital 50 mG/caffeine 40 mG 2 Tablet(s) Oral every 8 hours PRN Headache  aluminum hydroxide/magnesium hydroxide/simethicone Suspension 30 milliLiter(s) Oral every 4 hours PRN Dyspepsia  HYDROmorphone  Injectable 1 milliGRAM(s) IV Push every 4 hours PRN breathrough pain  labetalol Injectable 10 milliGRAM(s) IV Push every 4 hours PRN SBP >170  melatonin 3 milliGRAM(s) Oral at bedtime PRN Insomnia  metoclopramide Injectable 10 milliGRAM(s) IV Push three times a day PRN emesis  ondansetron Injectable 4 milliGRAM(s) IV Push every 8 hours PRN Nausea and/or Vomiting  oxyCODONE    IR 5 milliGRAM(s) Oral three times a day PRN Moderate Pain (4 - 6) and Severe Pain (7-10)  polyethylene glycol 3350 17 Gram(s) Oral daily PRN Constipation      Allergies    No Known Allergies    Intolerances          LABS:                          10.0   9.96  )-----------( 396      ( 26 Feb 2024 05:37 )             30.3     02-26    137  |  99  |  30.3<H>  ----------------------------<  90  3.6   |  25.0  |  2.56<H>    Ca    8.9      26 Feb 2024 05:37    TPro  6.0<L>  /  Alb  3.6  /  TBili  0.4  /  DBili  0.1  /  AST  31  /  ALT  50<H>  /  AlkPhos  54  02-26      Urinalysis Basic - ( 26 Feb 2024 05:37 )    Color: x / Appearance: x / SG: x / pH: x  Gluc: 90 mg/dL / Ketone: x  / Bili: x / Urobili: x   Blood: x / Protein: x / Nitrite: x   Leuk Esterase: x / RBC: x / WBC x   Sq Epi: x / Non Sq Epi: x / Bacteria: x        RADIOLOGY & ADDITIONAL TESTS:

## 2024-02-26 NOTE — PROGRESS NOTE ADULT - PROBLEM SELECTOR PLAN 3
- appreciate nephrology reccs  - Renal US unremarkable   - US duplex Mid-renal peak systolic velocity to aortic ratio is moderately elevated at 2.58. Normal arterial waveforms  - Cr elevated today from previous 2.39 -> 2.56   - nephrology recommending NS @ 50ml to be started last night and to finish NS 4-6 hours post cardiac cath  - monitor renal function daily.

## 2024-02-26 NOTE — PROGRESS NOTE ADULT - PROBLEM SELECTOR PLAN 2
- likely NSTEMI type II in the setting of renal dysfunction   - pt denies chest pain, palpitations, and anginal equivalent symptoms.   - TTE with EF 40 to 45%, G3DD, PASP 61  - Trps 21->67->86->11->125  - PYP can not indicative of TTR amyloid  - NPO since midnight for LHC/RHC today.

## 2024-02-26 NOTE — PROGRESS NOTE ADULT - ASSESSMENT
The patient is a 38 year old male with  no known past medical history undomiciled currently living in his car working in [employer], Tobacco Abuse/Chronic Smoker presents to [facility] ER c/o worsening HA for past 2 weeks admitted for HA 2/2 HTN Urgency c/b Elevated Troponin and ARF v CKD.    Assessment/Plan:    1. Hypertensive urgency   - Headache suspected secondary to elevated BP  -CTH WO negative  -MR brain without acute pathology  -Pain regimen; Opioids PO and parenteral  -EKG NSR LVH Diffuse TW changes  -TTE with EF 40% and significant LV strain  - PYP scan not suggestive of cardiac amyloid  - Renin/Aldosterone levels elevated, ratio normal  - Urine metanephrine serum metanephrine pending  - TSH WNL  - Renal artery duplex concerning for possible stenosis, MRa without any stenosis  - Hydralazine 100mg PO q8 + Labetalol TID + Amlodipine     2. Cardiomyopathy  _ Status post RHC/ LCH with normal coronaries and elevated RH pressures  - IV Lasix     3., ARF v CKD  -Likely 2/2 hypertensive renal disease   -CPK WNL  -Renal Artery Duplex/US result noted above, MRA negative     4.  Normocytic Anemia likely 2/2 AOCKD  -B12/Folate normal. low ferritin and iron  -ferrous sulfate QD  -Trend CBC    5,  Tobacco Abuse  - Cessation. Nicotine Patch PRN withdrawal    6.  Constipation  - bowel regimen    7.  Undomiciled  -Declined SW consultation    Disposition: Anticipate discharge home in 48 hours      The patient is a 38 year old male with  no known past medical history undomiciled currently living in his car working in [employer], Tobacco Abuse/Chronic Smoker presents to [facility] ER c/o worsening HA for past 2 weeks admitted for HA 2/2 HTN Urgency c/b Elevated Troponin and ARF v CKD.    Assessment/Plan:    1. Hypertensive urgency   - Headache suspected secondary to elevated BP  -CTH WO negative  -MR brain without acute pathology  -Pain regimen; Opioids PO and parenteral  -EKG NSR LVH Diffuse TW changes  -TTE with EF 40% and significant LV strain and severe pulmonary hypertension   - PYP scan not suggestive of cardiac amyloid  - Renin/Aldosterone levels elevated, ratio normal  - Urine metanephrine serum metanephrine pending  - TSH WNL  - Renal artery duplex concerning for possible stenosis, MRa without any stenosis  - Hydralazine 100mg PO q8 + Labetalol TID + Amlodipine     2. Cardiomyopathy  - Non ischemic cardiomyopathy  - Amyloidosis ruled out on PYP Scan  - Cardiac MRI pending   _ Status post RHC/ LCH with normal coronaries and elevated RH pressures  - IV Lasix 40mg x 1 now     3., ARF v CKD  -Likely 2/2 hypertensive renal disease   -CPK WNL  -Renal Artery Duplex/US result noted above, MRA negative     4.  Normocytic Anemia likely 2/2 AOCKD  -B12/Folate normal. low ferritin and iron  -ferrous sulfate QD  -Trend CBC    5,  Tobacco Abuse  - Cessation. Nicotine Patch PRN withdrawal    6.  Constipation  - bowel regimen    7.  Undomiciled  -Declined SW consultation    Disposition: Anticipate discharge home in 48 hours

## 2024-02-26 NOTE — CONSULT NOTE ADULT - ASSESSMENT
Risk Assessments:  ASA: 3  Mallampati:2  BRA: 2.4  creat 2.56  GFR 32    Indication: newly reduced EF 40-45%, grade 3 DD, LVH with apical sparing suggestive of amyloidosis       Plan:   -R/C   -Preferred access: RRA/RBV   -EKG and labs reviewed  -elevated creatinine 2.56 has been 1.9-2.5, nephro following IVF ordered MARION ppx, LIMITED DYE, DIAGNOSTIC STUDY ONLY   -Aspirin 81mg po pre procedure  -risks and benefits discussed with patient, consent obtained        Risk Assessments:  ASA: 3  Mallampati:2  BRA: 2.4  creat 2.56  GFR 32    Indication: newly reduced EF 40-45%, grade 3 DD, LVH with apical sparing suggestive of amyloidosis       Plan:   -R/LHC   -Preferred access: RRA/RBV   -EKG and labs reviewed  -elevated creatinine 2.56 has been 1.9-2.5, nephro following IVF ordered MARION ppx, LIMITED DYE, DIAGNOSTIC STUDY ONLY   -Aspirin 81mg po pre procedure  -risks and benefits discussed with patient, consent obtained     Post Cath Addendum   s/p R/LHC with Dr. Gonzalez, revealing normal cors, elevated right heart pressures: RA 20/17/13, RV 60/0/19, PA 63/44/28, PCWP 28 PVR 2.6, CO 6.18  Pt rec'd IV sedation, heparin 4,000 units, and omnipaque 31ml    Plan:  -Formal cath report pending  -Post procedure management/monitoring per protocol  -Access site precautions  -remove RBV sheath at 1115, radial sheath removal 1215, OOB 1230  -IVF discontinued, will give Lasix 40mg IVP, strict I&O's and careful monitoring of renal indices   -continue antihypertensives per clinical cards   -Educated regarding post procedure management and care  -Discussed the importance of RF modification

## 2024-02-26 NOTE — CHART NOTE - NSCHARTNOTEFT_GEN_A_CORE
Medicine PA-  Pt anxious about his procedure.  No CP or palpitations, xanax 0.25mg given.  NPO @ midnight, NS @ 50ml/hr.

## 2024-02-26 NOTE — PROGRESS NOTE ADULT - NS ATTEND AMEND GEN_ALL_CORE FT
As above, cath with no sig. CAD.  CMP possibly hypertensive, will get CMR for final confirmation, otherwise needs strict BP control.

## 2024-02-26 NOTE — CONSULT NOTE ADULT - SUBJECTIVE AND OBJECTIVE BOX
History of present illness: 38-year-old male with no medical follow reports no significant past medical history presented with headache for last 2 weeks.  On arrival systolic blood pressure more than 200 and labs significant for elevated troponin and creatinine.  CTH negative for any acute finding.  He denied increased urination frequency, dysuria, gross hematuria, fever, chills, skin rash or itching, nausea, vomiting, diarrhea, bleeding problems and joint pain or swelling.  Denies chronic NSAID use. Review of other systems was negative.    Review of systems: All systems were reviewed in detail, pertinent positive and negative have been mentioned above, otherwise negative.  Past medical and surgical history: Adenoidectomy  Allergies: NKDA  Medications: None  Family history: No pertinent family history of renal disease or electrolyte disorder in first degree relatives.  Social history: Active smoker, denies alcohol or drug use.    Vital Signs Last 24 Hrs  T(C): 36.7 (21 Feb 2024 20:31), Max: 36.9 (21 Feb 2024 07:32)  T(F): 98 (21 Feb 2024 20:31), Max: 98.5 (21 Feb 2024 07:32)  HR: 91 (21 Feb 2024 20:31) (80 - 103)  BP: 201/114 (21 Feb 2024 20:31) (153/75 - 201/114)  RR: 18 (21 Feb 2024 20:31) (16 - 18)  SpO2: 95% (21 Feb 2024 20:31) (95% - 98%)  Parameters below as of 21 Feb 2024 05:15  Patient On (Oxygen Delivery Method): room air    Physical Exam:  Gen: no acute distress  MS: alert, conversing normally  Eyes: EOMI, no icterus  HENT: NCAT, MMM  CV: rhythm reg reg, rate normal, no m/g/r, no LE edema  Chest: CTAB, no w/r/r,  Abd: soft, NT, ND  Neuro: moving all 4 limbs spontaneously, no tremor  MSK: normal bulk and tone, no joint swelling  Skin: dry, warm, no rash or jaundice    Medications:  heparin   Injectable 5000 Unit(s) SubCutaneous every 8 hours  hydrALAZINE 100 milliGRAM(s) Oral every 8 hours    02-21    137  |  100  |  31.4<H>  ----------------------------<  105<H>  3.6   |  20.0<L>  |  2.13<H>    Ca    8.6      21 Feb 2024 08:36  Phos  2.8     02-21  Mg     2.3     02-21    TPro  6.1<L>  /  Alb  3.7  /  TBili  0.5  /  DBili  x   /  AST  43<H>  /  ALT  66<H>  /  AlkPhos  74  02-20    Creatinine: 2.13 mg/dL (02-21-24 @ 08:36)  Creatinine: 2.61 mg/dL (02-20-24 @ 17:05)                          11.3   13.60 )-----------( 255      ( 21 Feb 2024 08:36 )             35.0     Imaging: Reviewed.    
ADVANCED HEART FAILURE - CONSULT NOTE  [address] [place] [address]  Office Phone: [phone]/Fax: [phone]  Service/On Call Phone [phone]  _______________________________________________________________________________________________________    HPI:  37 y/o M current smoker with no known PMH or recent medical care, undomiciled currently living in his car working in [place], who presented to [facility] ER on 2/20 with worsening HA for past 2 weeks. Found to have hypertensive urgency (BP on admission 227/147) and TTE showed LVEF 40-45% with severe LVH. Admission labs also notable for MANJIT, renal insufficiency (unknown baseline), elevated troponin, mildly elevated AST/ALT, and elevated pro-BNP 5093. Utox positive for THC.      PAST MEDICAL & SURGICAL HISTORY:  Asthma    REVIEW OF SYSTEMS:  14 point ROS negative in detail apart from as documented in HPI.    MEDICATIONS  (STANDING):  amLODIPine   Tablet 10 milliGRAM(s) Oral daily  aspirin  chewable 81 milliGRAM(s) Oral daily  ferrous    sulfate 325 milliGRAM(s) Oral daily  heparin   Injectable 5000 Unit(s) SubCutaneous every 8 hours  hydrALAZINE 100 milliGRAM(s) Oral every 8 hours  labetalol 200 milliGRAM(s) Oral three times a day    MEDICATIONS  (PRN):  acetaminophen     Tablet .. 650 milliGRAM(s) Oral every 6 hours PRN Temp greater or equal to 38C (100.4F), Mild Pain (1 - 3)  aluminum hydroxide/magnesium hydroxide/simethicone Suspension 30 milliLiter(s) Oral every 4 hours PRN Dyspepsia  HYDROmorphone  Injectable 1 milliGRAM(s) IV Push every 4 hours PRN breathrough pain  labetalol Injectable 10 milliGRAM(s) IV Push every 4 hours PRN SBP >170  melatonin 3 milliGRAM(s) Oral at bedtime PRN Insomnia  metoclopramide Injectable 10 milliGRAM(s) IV Push three times a day PRN emesis  ondansetron Injectable 4 milliGRAM(s) IV Push every 8 hours PRN Nausea and/or Vomiting  oxyCODONE    IR 5 milliGRAM(s) Oral three times a day PRN Moderate Pain (4 - 6) and Severe Pain (7-10)    Allergies  No Known Allergies    FAMILY HISTORY:  Mother- HTN    ICU Vital Signs Last 24 Hrs  T(C): 36.7, Max: 36.9 (02-23 @ 04:58)  HR: 92 (84 - 99)  BP: 154/85 (144/78 - 194/106)  BP(mean): 136 (120 - 136)  ABP: --  ABP(mean): --  RR: 18 (18 - 18)  SpO2: 97% (95% - 98%)        I&O's Summary Last 24 Hrs    Tele: SR 90s    Physical Exam:    General: No distress. Comfortable.  Neck: JVP not elevated.  Respiratory: Clear to auscultation bilaterally  CV: RRR. Normal S1 and S2. No murmurs, rub, or gallops. Radial pulses normal.  Abdomen: Soft, non-distended, non-tender  Extremities: Warm, no edema  Neurology: Non-focal, alert and oriented times three.   Psych: Affect normal    Labs:    ( 02-23-24 @ 08:20 )               10.8   12.76 )--------( 435                  33.0     ( 02-23-24 @ 08:20 )     134  |  95  |  20.3  ---------------------<  151  3.3  |  26.0  |  1.96    Ca 9.0  Phos x   Mg x     ( 02-20-24 @ 17:05 )  TPro  6.1  /  Alb  3.7  /  TBili  0.5  /  DBili  x   /  AST  43  /  ALT  66  /  AlkPhos  74    ( 02-23-24 @ 08:20 )  TropHS 125   / CK x     / CKMB x      ( 02-22-24 @ 05:21 )  TropHS 111   / CK x     / CKMB x      
                                             [facility]                                              INTERVENTIONAL CARDIOLOGY AT [place] [address]                                             Telephone: [phone]. Fax:[phone]                                                       INTERVENTIONAL CARDIOLOGY CONSULTATION NOTE                                                                                             History obtained by: Patient and medical record  Community Cardiologist:   Reason for Consultation: Evaluation for cardiac catheterization  Available pt records reviewed: Yes [ x ] No [  ]    Chief complaint:    Patient is a 38y old  Male who presents with a chief complaint of HTN Urgency, Elevated Trop, ARF v CKD, Anemia (25 Feb 2024 21:04)      HPI:  Patient seen/examined prior to midnight on 2/20/24.    38M with no PMHX or recent medical care, undomiciled currently living in his car working in [place], Tobacco Abuse/Chronic Smoker presents to [facility] ER c/o worsening HA for past 2 weeks. ROS +SOB/CARDENAS. EKG +Diffuse TWI and LVH. CTH WO negative. Uncontrolled BP on arrival /142 currently 184/104 after IV Hydralazine/Labetalol/Lasix in ED. Labs significant for elevated troponin and pBNP, Cr 2.61 (no hx kidney disease), and anemia. Cardiology Consulted. Pt c/o HA. Family at bedside. Denies current CP/SOB.     ROS negative unless mentioned.    PMHX: Denies  PSHX: Adenoidectomy  FamHx: Denies fam hx CHF  Social Hx: +Current Smoker  NKDA (20 Feb 2024 23:42)      Anginal Class: n/a        Angina (Class):        Ischemic Symptoms:     Heart Failure:        Systolic/Diastolic/Combined: combined        NYHA Class (within 2 weeks): 2      PAST MEDICAL HISTORY  Asthma        Associated Risk Factors:        Frailty Assessment: (none/mild/mod/severe):       Cerebrovascular Disease: N/A       Chronic Lung Disease: N/A       Peripheral Arterial Disease: N/A       Chronic Kidney Disease (if yes, what is GFR): N/A       Uncontrolled Diabetes (if yes, what is HgbA1C or FBS): N/A       Poorly Controlled Hypertension (if yes, what is SBP): N/A       Morbid Obesity (if yes, what is BMI): N/A       History of Recent Ventricular Arrhythmia: N/A       Inability to Ambulate Safely: N/A       Need for Therapeutic Anticoagulation: N/A       Antiplatelet or Contrast Allergy: N/A      PAST SURGICAL HISTORY      SOCIAL HISTORY:  daily THC, no tobacco, occasional ETOH     FAMILY HISTORY: mother HTN    Family History of Premature Cardiovascular Disease:  Yes [  ] No [ x ]    HOME MEDICATIONS: none       CURRENT CARDIAC MEDICATIONS:  amLODIPine   Tablet 10 milliGRAM(s) Oral daily  hydrALAZINE 100 milliGRAM(s) Oral every 8 hours  labetalol 300 milliGRAM(s) Oral every 8 hours  labetalol Injectable 10 milliGRAM(s) IV Push every 4 hours PRN SBP >170      Antianginal Therapies:        Beta Blockers:         Calcium Channel Blockers: norvasc       Long Acting Nitrates:        Ranexa:     ALLERGIES:   No Known Allergies      REVIEW OF SYMPTOMS:   CONSTITUTIONAL: no fever, no chills, no weight loss, no weight gain, +++ HA/fatigue   CARDIOVASCULAR: no chest pain, sob   RESPIRATORY: no Shortness of breath, no cough, no wheezing  : No dysuria, no hematuria   GI: No dark color stool, no nausea, no diarrhea, no constipation, no abdominal pain   NEURO: No headache, no slurred speech   ALL OTHER REVIEW OF SYSTEMS ARE NEGATIVE.    VITAL SIGNS:  T(C): 36.5 (02-26-24 @ 05:18), Max: 36.9 (02-25-24 @ 11:13)  T(F): 97.7 (02-26-24 @ 05:18), Max: 98.5 (02-25-24 @ 11:13)  HR: 81 (02-26-24 @ 07:13) (72 - 86)  BP: 147/78 (02-26-24 @ 07:13) (147/78 - 170/92)  RR: 16 (02-26-24 @ 07:13) (16 - 18)  SpO2: 98% (02-26-24 @ 07:13) (95% - 98%)    INTAKE AND OUTPUT:       PHYSICAL EXAM:  Constitutional: Comfortable . No acute distress.   HEENT: Atraumatic and normocephalic , neck is supple . no JVD. No carotid bruit.  CNS: A&Ox3. No focal deficits.   Respiratory: CTAB, unlabored   Cardiovascular: RRR normal s1 s2. No murmur. No rubs or gallop.  Gastrointestinal: Soft, non-tender. +Bowel sounds.   Extremities: 2+ Peripheral Pulses, No clubbing, cyanosis, or edema  Psychiatric: Calm . no agitation.   Skin: Warm and dry, no ulcers on extremities     LABS:  ( 20 Feb 2024 17:05 )  Troponin T  X    ,  CPK  117  , CKMB  X    , BNP X                                  10.0   9.96  )-----------( 396      ( 26 Feb 2024 05:37 )             30.3     02-26    137  |  99  |  30.3<H>  ----------------------------<  90  3.6   |  25.0  |  2.56<H>    Ca    8.9      26 Feb 2024 05:37    TPro  6.0<L>  /  Alb  3.6  /  TBili  0.4  /  DBili  0.1  /  AST  31  /  ALT  50<H>  /  AlkPhos  54  02-26      Urinalysis Basic - ( 26 Feb 2024 05:37 )    Color: x / Appearance: x / SG: x / pH: x  Gluc: 90 mg/dL / Ketone: x  / Bili: x / Urobili: x   Blood: x / Protein: x / Nitrite: x   Leuk Esterase: x / RBC: x / WBC x   Sq Epi: x / Non Sq Epi: x / Bacteria: x                ECG: < from: 12 Lead ECG (02.23.24 @ 17:38) >  Diagnosis Line Normal sinus rhythm  Possible Left atrial enlargement  Left axis deviation  Left ventricular hypertrophy  ST & Marked T wave abnormality, consider lateral ischemia  Prolonged QT  Abnormal ECG    < end of copied text >    Prior ECG: Yes [ x ] No [  ]    CARDIAC TESTING   ECHO:  < from: TTE W or WO Ultrasound Enhancing Agent (02.22.24 @ 08:41) >  CONCLUSIONS:      1. The left atrium is mildly dilated.   2. Severe left ventricular hypertrophy.   3. Left ventricular systolic function is moderately decreased with an ejection fraction visually estimated at 40 to 45 %.   4. Significantly abnormal LV strain with sparing of the LV apex. With speckled pattern of the LV and LVH. Findings are suggestive of cardiac amyloidosis. GLS - 6.7%.   5. There is severe (grade 3) left ventricular diastolic dysfunction, with elevated filling pressure.   6. The right atrium is mildly dilated.   7. Normal right ventricular cavity size, with increasedwall thickness, and mildly reduced systolic function.   8. Mild tricuspid regurgitation.   9. Estimated pulmonary artery systolic pressure is 61 mmHg, consistent with severe pulmonary hypertension.  10. No pericardial effusion seen.        < from: NM Amyloidosis SPECT, Single Area Single Day (02.23.24 @ 11:18) >    ACC: [id number] EXAM:  NM AMYLOIDOSIS LOC SPECT SA SD   ORDERED BY: JORDAN DEAN     PROCEDURE DATE:  02/23/2024          INTERPRETATION:  RADIOPHARMACEUTICAL: 15.1 mCi Tc-99m-pyrophosphate; i.v.    CLINICAL INFORMATION: 38-year-old male with uncontrolled hypertension,   elevated troponin, echocardiography showed severe left ventricular   hypertrophy with decreased systolic function and decreased LVEF. Patient   is referred to evaluate for cardiac transthyretin amyloidosis.    TECHNIQUE:Approximately 3 hours after radiopharmaceutical administration   SPECT of the chest was obtained.    COMPARISON: No prior myocardial imaging.    FINDINGS: The technical quality of the images is adequate. There is mild   myocardial activity.    Left Ventricular Myocardium to Bone (visual at 3 hrs): Grade: 1  (normal:   0)    Right Ventricular Myocardium to Bone (visual at 3 hrs): Grade: 1    (normal: 0)    IMPRESSION: Cardiac amyloid imaging study is  not strongly suggestive of   transthyretin cardiac amyloidosis.    --- End of Report ---    < end of copied text >          
                                             [facility]                                              CARDIOLOGY AT [address], [place] [address]                                             Telephone: [phone]. Fax:[phone]                                                         CARDIOLOGY CONSULTATION NOTE                                                                                             Consult requested by:  Dr. Ko  History obtained by: Patient and medical record  Community Cardiologist: None   obtained: Yes [  ] No [  ]  Reason for Consultation: Hypertensive urgency  Avialable out pt records reviewed: Yes [  ] No [ x ]    This is a 37 y/o male with No past medical hx  has not been to a Dr in many years who presents with 3 weeks of headaches, dizziness and vomiting.  Patients symptoms persisted today and he could not take it any longer and he presented to the ER. On evaluation he was found to have a b/p of 227/147.  We are being asked to see him for b/p control.  He states he does not get any chest pain when he walks but did get pain in his chest last night when he went to the bathroom but it was on inspiration and he has had not chest pain today.  He denies any back pain or head trauma  Denies any illicit drug use    CARDIAC TESTING   None      PAST MEDICAL HISTORY  Asthma    PAST SURGICAL HISTORY  Tonsillectomy    SOCIAL HISTORY:    CIGARETTES:   Unable to quantify states past few weeks only one cig every few days  ALCOHOL:  denies  DRUGS:  Smokes marijuana    FAMILY HISTORY:  Mom with htn  Dad  of mva  Family History of Cardiovascular Disease:  Yes [  ] No [ x ]  Coronary Artery Disease in first degree relative: Yes [  ] No [ x ]  Sudden Cardiac Death in First degree relative: Yes [  ] No [x  ]    HOME MEDICATIONS:  None    CURRENT MEDICATIONS:  hydrALAZINE 100 milliGRAM(s) Oral every 8 hours  labetalol Injectable 10 milliGRAM(s) IV Push once     ALLERGIES: Allergies  NKDA    REVIEW OF SYMPTOMS:   CONSTITUTIONAL: No fever, no chills, no weight loss, no weight gain, +fatigue   ENMT:  No vertigo; No sinus or throat pain  NECK: No pain or stiffness  CARDIOVASCULAR: No chest pain, no dyspnea, no syncope/presyncope, no palpitations, no dizziness, no Orthopnea, no Paroxsymal nocturnal dyspnea  RESPIRATORY: no Shortness of breath, no cough, no wheezing  : No dysuria, no hematuria   GI: No dark color stool, no nausea, no diarrhea, no constipation, no abdominal pain   NEURO: No headache, no slurred speech   MUSCULOSKELETAL: No joint pain or swelling; No muscle, back, or extremity pain  PSYCH: No agitation, no anxiety.    ALL OTHER REVIEW OF SYSTEMS ARE NEGATIVE.    Vital Signs Last 24 Hrs  T(C): 36.6 (2024 16:01), Max: 36.6 (2024 16:01)  T(F): 97.9 (2024 16:01), Max: 97.9 (2024 16:01)  HR: 93 (2024 17:15) (93 - 102)  BP: 216/136 (2024 17:48) (216/136 - 227/142)  BP(mean): --  RR: 18 (2024 17:15) (18 - 20)  SpO2: 97% (2024 17:15) (97% - 98%)    Parameters below as of 2024 17:15  Patient On (Oxygen Delivery Method): room air    INTAKE AND OUTPUT:     PHYSICAL EXAM:  b/p left arm 238/146  right arm 218/129  Constitutional: Comfortable . No acute distress.   HEENT: nO JVD No carotid bruitts  CNS: A&Ox3. No focal deficits.   Respiratory: CTAB, unlabored   Cardiovascular: RRR normal s1 s2. No murmur. No rubs or gallop.  Gastrointestinal: Soft, non-tender. +Bowel sounds.   MSK: full ROM extremities x 4  Extremities: No edema. No cyanosis  pulse equal in both arms and feet  feet warm good dp pulses  Psychiatric: Calm . no agitation.   Skin: Warm and dry, no ulcers on extremities     LABS:                        10.5   11.51 )-----------( 371      ( 2024 17:05 )             33.2     02-20    137  |  101  |  34.5<H>  ----------------------------<  104<H>  3.7   |  23.0  |  2.61<H>    Ca    8.9      2024 17:05    TPro  6.1<L>  /  Alb  3.7  /  TBili  0.5  /  DBili  x   /  AST  43<H>  /  ALT  66<H>  /  AlkPhos  74  02-20    CARDIAC MARKERS ( 2024 17:05 )  x     / x     / 117 U/L / x     / x        ;p-BNP=  PT/INR - ( 2024 17:05 )   PT: 12.9 sec;   INR: 1.17 ratio       PTT - ( 2024 17:05 )  PTT:33.6 sec  Urinalysis Basic - ( 2024 17:05 )    Color: x / Appearance: x / SG: x / pH: x  Gluc: 104 mg/dL / Ketone: x  / Bili: x / Urobili: x   Blood: x / Protein: x / Nitrite: x   Leuk Esterase: x / RBC: x / WBC x   Sq Epi: x / Non Sq Epi: x / Bacteria: x    ECG: NSR LVH t wave down v3-v6  no previous ekg  RADIOLOGY & ADDITIONAL STUDIES:    X-ray:  reviewed by me. Chest napd

## 2024-02-26 NOTE — PROGRESS NOTE ADULT - ASSESSMENT
39 y/o male with No past medical hx  has not been to a Dr in many years who presents with 3 weeks of headaches, dizziness and vomiting.  Patients symptoms persisted today and he could not take it any longer and he presented to the ER.   On evaluation he was found to have a b/p of 227/147.  We are being asked to see him for b/p control. He states he does not get any chest pain when he walks but did get pain in his chest last night when he went to the bathroom but it was on inspiration and   he has had not chest pain today.  He denies any back pain or head trauma  ekg changes c/w LVH  trop 71  bnp [id number]

## 2024-02-26 NOTE — PROGRESS NOTE ADULT - PROBLEM SELECTOR PLAN 4
- appreciate HF reccs  - PYP scan negative for TTR amyloidosis  - PETTY Kappa 2.98 (elevated), PETTY Lambda 1.81(WNL), [place]/Lambda free light chain ratio 1.65  - pending cMRI. May need to be performed tomorrow depending on renal function.

## 2024-02-27 ENCOUNTER — TRANSCRIPTION ENCOUNTER (OUTPATIENT)
Age: 39
End: 2024-02-27

## 2024-02-27 VITALS
SYSTOLIC BLOOD PRESSURE: 164 MMHG | OXYGEN SATURATION: 97 % | RESPIRATION RATE: 18 BRPM | HEART RATE: 94 BPM | DIASTOLIC BLOOD PRESSURE: 78 MMHG | TEMPERATURE: 98 F

## 2024-02-27 LAB
% ALBUMIN: 57.7 % — SIGNIFICANT CHANGE UP
% ALPHA 1: 5.6 % — SIGNIFICANT CHANGE UP
% ALPHA 2: 9.1 % — SIGNIFICANT CHANGE UP
% BETA: 13.7 % — SIGNIFICANT CHANGE UP
% GAMMA: 13.9 % — SIGNIFICANT CHANGE UP
ALBUMIN SERPL ELPH-MCNC: 3.4 G/DL — LOW (ref 3.6–5.5)
ALBUMIN SERPL ELPH-MCNC: 3.6 G/DL — SIGNIFICANT CHANGE UP (ref 3.3–5.2)
ALBUMIN/GLOB SERPL ELPH: 1.4 RATIO — SIGNIFICANT CHANGE UP
ALP SERPL-CCNC: 56 U/L — SIGNIFICANT CHANGE UP (ref 40–120)
ALPHA1 GLOB SERPL ELPH-MCNC: 0.3 G/DL — SIGNIFICANT CHANGE UP (ref 0.1–0.4)
ALPHA2 GLOB SERPL ELPH-MCNC: 0.5 G/DL — SIGNIFICANT CHANGE UP (ref 0.5–1)
ALT FLD-CCNC: 80 U/L — HIGH
ANION GAP SERPL CALC-SCNC: 12 MMOL/L — SIGNIFICANT CHANGE UP (ref 5–17)
AST SERPL-CCNC: 58 U/L — HIGH
B-GLOBULIN SERPL ELPH-MCNC: 0.8 G/DL — SIGNIFICANT CHANGE UP (ref 0.5–1)
BASOPHILS # BLD AUTO: 0.08 K/UL — SIGNIFICANT CHANGE UP (ref 0–0.2)
BASOPHILS NFR BLD AUTO: 0.8 % — SIGNIFICANT CHANGE UP (ref 0–2)
BILIRUB SERPL-MCNC: 0.7 MG/DL — SIGNIFICANT CHANGE UP (ref 0.4–2)
BUN SERPL-MCNC: 29 MG/DL — HIGH (ref 8–20)
CALCIUM SERPL-MCNC: 9 MG/DL — SIGNIFICANT CHANGE UP (ref 8.4–10.5)
CHLORIDE SERPL-SCNC: 97 MMOL/L — SIGNIFICANT CHANGE UP (ref 96–108)
CO2 SERPL-SCNC: 24 MMOL/L — SIGNIFICANT CHANGE UP (ref 22–29)
CREAT SERPL-MCNC: 2.75 MG/DL — HIGH (ref 0.5–1.3)
EGFR: 29 ML/MIN/1.73M2 — LOW
EOSINOPHIL # BLD AUTO: 0.36 K/UL — SIGNIFICANT CHANGE UP (ref 0–0.5)
EOSINOPHIL NFR BLD AUTO: 3.5 % — SIGNIFICANT CHANGE UP (ref 0–6)
GAMMA GLOBULIN: 0.8 G/DL — SIGNIFICANT CHANGE UP (ref 0.6–1.6)
GLUCOSE SERPL-MCNC: 103 MG/DL — HIGH (ref 70–99)
HCT VFR BLD CALC: 30.6 % — LOW (ref 39–50)
HGB BLD-MCNC: 10.1 G/DL — LOW (ref 13–17)
IMM GRANULOCYTES NFR BLD AUTO: 0.4 % — SIGNIFICANT CHANGE UP (ref 0–0.9)
INTERPRETATION SERPL IFE-IMP: SIGNIFICANT CHANGE UP
INTERPRETATION SERPL IFE-IMP: SIGNIFICANT CHANGE UP
LYMPHOCYTES # BLD AUTO: 1.29 K/UL — SIGNIFICANT CHANGE UP (ref 1–3.3)
LYMPHOCYTES # BLD AUTO: 12.7 % — LOW (ref 13–44)
MCHC RBC-ENTMCNC: 26.2 PG — LOW (ref 27–34)
MCHC RBC-ENTMCNC: 33 GM/DL — SIGNIFICANT CHANGE UP (ref 32–36)
MCV RBC AUTO: 79.5 FL — LOW (ref 80–100)
MONOCYTES # BLD AUTO: 1.15 K/UL — HIGH (ref 0–0.9)
MONOCYTES NFR BLD AUTO: 11.3 % — SIGNIFICANT CHANGE UP (ref 2–14)
NEUTROPHILS # BLD AUTO: 7.25 K/UL — SIGNIFICANT CHANGE UP (ref 1.8–7.4)
NEUTROPHILS NFR BLD AUTO: 71.3 % — SIGNIFICANT CHANGE UP (ref 43–77)
PLATELET # BLD AUTO: 401 K/UL — HIGH (ref 150–400)
POTASSIUM SERPL-MCNC: 3.7 MMOL/L — SIGNIFICANT CHANGE UP (ref 3.5–5.3)
POTASSIUM SERPL-SCNC: 3.7 MMOL/L — SIGNIFICANT CHANGE UP (ref 3.5–5.3)
PROT PATTERN SERPL ELPH-IMP: SIGNIFICANT CHANGE UP
PROT SERPL-MCNC: 5.9 G/DL — LOW (ref 6–8.3)
PROT SERPL-MCNC: 6.2 G/DL — LOW (ref 6.6–8.7)
RBC # BLD: 3.85 M/UL — LOW (ref 4.2–5.8)
RBC # FLD: 17.1 % — HIGH (ref 10.3–14.5)
SODIUM SERPL-SCNC: 133 MMOL/L — LOW (ref 135–145)
WBC # BLD: 10.17 K/UL — SIGNIFICANT CHANGE UP (ref 3.8–10.5)
WBC # FLD AUTO: 10.17 K/UL — SIGNIFICANT CHANGE UP (ref 3.8–10.5)

## 2024-02-27 PROCEDURE — 93306 TTE W/DOPPLER COMPLETE: CPT

## 2024-02-27 PROCEDURE — 96375 TX/PRO/DX INJ NEW DRUG ADDON: CPT

## 2024-02-27 PROCEDURE — 84155 ASSAY OF PROTEIN SERUM: CPT

## 2024-02-27 PROCEDURE — 83835 ASSAY OF METANEPHRINES: CPT

## 2024-02-27 PROCEDURE — 93975 VASCULAR STUDY: CPT

## 2024-02-27 PROCEDURE — 82570 ASSAY OF URINE CREATININE: CPT

## 2024-02-27 PROCEDURE — 99285 EMERGENCY DEPT VISIT HI MDM: CPT | Mod: 25

## 2024-02-27 PROCEDURE — 36415 COLL VENOUS BLD VENIPUNCTURE: CPT

## 2024-02-27 PROCEDURE — 76775 US EXAM ABDO BACK WALL LIM: CPT

## 2024-02-27 PROCEDURE — 85027 COMPLETE CBC AUTOMATED: CPT

## 2024-02-27 PROCEDURE — 84300 ASSAY OF URINE SODIUM: CPT

## 2024-02-27 PROCEDURE — 84165 PROTEIN E-PHORESIS SERUM: CPT

## 2024-02-27 PROCEDURE — 80076 HEPATIC FUNCTION PANEL: CPT

## 2024-02-27 PROCEDURE — 83550 IRON BINDING TEST: CPT

## 2024-02-27 PROCEDURE — 84443 ASSAY THYROID STIM HORMONE: CPT

## 2024-02-27 PROCEDURE — 85730 THROMBOPLASTIN TIME PARTIAL: CPT

## 2024-02-27 PROCEDURE — 99232 SBSQ HOSP IP/OBS MODERATE 35: CPT

## 2024-02-27 PROCEDURE — 93005 ELECTROCARDIOGRAM TRACING: CPT

## 2024-02-27 PROCEDURE — 85025 COMPLETE CBC W/AUTO DIFF WBC: CPT

## 2024-02-27 PROCEDURE — 82607 VITAMIN B-12: CPT

## 2024-02-27 PROCEDURE — C1769: CPT

## 2024-02-27 PROCEDURE — C1894: CPT

## 2024-02-27 PROCEDURE — 96376 TX/PRO/DX INJ SAME DRUG ADON: CPT

## 2024-02-27 PROCEDURE — 71045 X-RAY EXAM CHEST 1 VIEW: CPT

## 2024-02-27 PROCEDURE — 84484 ASSAY OF TROPONIN QUANT: CPT

## 2024-02-27 PROCEDURE — 84244 ASSAY OF RENIN: CPT

## 2024-02-27 PROCEDURE — 70551 MRI BRAIN STEM W/O DYE: CPT

## 2024-02-27 PROCEDURE — 84466 ASSAY OF TRANSFERRIN: CPT

## 2024-02-27 PROCEDURE — 78803 RP LOCLZJ TUM SPECT 1 AREA: CPT | Mod: MC

## 2024-02-27 PROCEDURE — 80061 LIPID PANEL: CPT

## 2024-02-27 PROCEDURE — 82746 ASSAY OF FOLIC ACID SERUM: CPT

## 2024-02-27 PROCEDURE — 80307 DRUG TEST PRSMV CHEM ANLYZR: CPT

## 2024-02-27 PROCEDURE — 83521 IG LIGHT CHAINS FREE EACH: CPT

## 2024-02-27 PROCEDURE — 83735 ASSAY OF MAGNESIUM: CPT

## 2024-02-27 PROCEDURE — 82088 ASSAY OF ALDOSTERONE: CPT

## 2024-02-27 PROCEDURE — 82248 BILIRUBIN DIRECT: CPT

## 2024-02-27 PROCEDURE — 83935 ASSAY OF URINE OSMOLALITY: CPT

## 2024-02-27 PROCEDURE — 93460 R&L HRT ART/VENTRICLE ANGIO: CPT

## 2024-02-27 PROCEDURE — 99239 HOSP IP/OBS DSCHRG MGMT >30: CPT

## 2024-02-27 PROCEDURE — 84156 ASSAY OF PROTEIN URINE: CPT

## 2024-02-27 PROCEDURE — C1887: CPT

## 2024-02-27 PROCEDURE — 70450 CT HEAD/BRAIN W/O DYE: CPT | Mod: MA

## 2024-02-27 PROCEDURE — 81001 URINALYSIS AUTO W/SCOPE: CPT

## 2024-02-27 PROCEDURE — 96374 THER/PROPH/DIAG INJ IV PUSH: CPT

## 2024-02-27 PROCEDURE — 82550 ASSAY OF CK (CPK): CPT

## 2024-02-27 PROCEDURE — 80048 BASIC METABOLIC PNL TOTAL CA: CPT

## 2024-02-27 PROCEDURE — 82728 ASSAY OF FERRITIN: CPT

## 2024-02-27 PROCEDURE — C8900: CPT

## 2024-02-27 PROCEDURE — 83036 HEMOGLOBIN GLYCOSYLATED A1C: CPT

## 2024-02-27 PROCEDURE — 83880 ASSAY OF NATRIURETIC PEPTIDE: CPT

## 2024-02-27 PROCEDURE — 80053 COMPREHEN METABOLIC PANEL: CPT

## 2024-02-27 PROCEDURE — A9538: CPT

## 2024-02-27 PROCEDURE — 83540 ASSAY OF IRON: CPT

## 2024-02-27 PROCEDURE — 93356 MYOCRD STRAIN IMG SPCKL TRCK: CPT

## 2024-02-27 PROCEDURE — 84133 ASSAY OF URINE POTASSIUM: CPT

## 2024-02-27 PROCEDURE — 86334 IMMUNOFIX E-PHORESIS SERUM: CPT

## 2024-02-27 PROCEDURE — 84540 ASSAY OF URINE/UREA-N: CPT

## 2024-02-27 PROCEDURE — 85610 PROTHROMBIN TIME: CPT

## 2024-02-27 PROCEDURE — 84100 ASSAY OF PHOSPHORUS: CPT

## 2024-02-27 RX ORDER — ISOSORBIDE DINITRATE 5 MG/1
1 TABLET ORAL
Qty: 90 | Refills: 0
Start: 2024-02-27 | End: 2024-03-27

## 2024-02-27 RX ORDER — CARVEDILOL PHOSPHATE 80 MG/1
1 CAPSULE, EXTENDED RELEASE ORAL
Qty: 60 | Refills: 0
Start: 2024-02-27 | End: 2024-03-27

## 2024-02-27 RX ORDER — ASPIRIN/CALCIUM CARB/MAGNESIUM 324 MG
1 TABLET ORAL
Qty: 30 | Refills: 0
Start: 2024-02-27 | End: 2024-03-27

## 2024-02-27 RX ORDER — ISOSORBIDE DINITRATE 5 MG/1
20 TABLET ORAL THREE TIMES A DAY
Refills: 0 | Status: DISCONTINUED | OUTPATIENT
Start: 2024-02-27 | End: 2024-02-27

## 2024-02-27 RX ORDER — FERROUS SULFATE 325(65) MG
1 TABLET ORAL
Qty: 30 | Refills: 0
Start: 2024-02-27 | End: 2024-03-27

## 2024-02-27 RX ORDER — CARVEDILOL PHOSPHATE 80 MG/1
25 CAPSULE, EXTENDED RELEASE ORAL EVERY 12 HOURS
Refills: 0 | Status: DISCONTINUED | OUTPATIENT
Start: 2024-02-27 | End: 2024-02-27

## 2024-02-27 RX ORDER — ISOSORBIDE DINITRATE 5 MG/1
20 TABLET ORAL ONCE
Refills: 0 | Status: COMPLETED | OUTPATIENT
Start: 2024-02-27 | End: 2024-02-27

## 2024-02-27 RX ORDER — SODIUM CHLORIDE 9 MG/ML
500 INJECTION INTRAMUSCULAR; INTRAVENOUS; SUBCUTANEOUS ONCE
Refills: 0 | Status: COMPLETED | OUTPATIENT
Start: 2024-02-27 | End: 2024-02-27

## 2024-02-27 RX ORDER — NIFEDIPINE 30 MG
60 TABLET, EXTENDED RELEASE 24 HR ORAL DAILY
Refills: 0 | Status: DISCONTINUED | OUTPATIENT
Start: 2024-02-27 | End: 2024-02-27

## 2024-02-27 RX ORDER — HYDRALAZINE HCL 50 MG
1 TABLET ORAL
Qty: 90 | Refills: 0
Start: 2024-02-27 | End: 2024-03-27

## 2024-02-27 RX ORDER — NIFEDIPINE 30 MG
1 TABLET, EXTENDED RELEASE 24 HR ORAL
Qty: 30 | Refills: 0
Start: 2024-02-27 | End: 2024-03-27

## 2024-02-27 RX ADMIN — SODIUM CHLORIDE 1000 MILLILITER(S): 9 INJECTION INTRAMUSCULAR; INTRAVENOUS; SUBCUTANEOUS at 08:48

## 2024-02-27 RX ADMIN — Medication 2 TABLET(S): at 08:52

## 2024-02-27 RX ADMIN — Medication 650 MILLIGRAM(S): at 00:30

## 2024-02-27 RX ADMIN — Medication 100 MILLIGRAM(S): at 05:53

## 2024-02-27 RX ADMIN — ISOSORBIDE DINITRATE 20 MILLIGRAM(S): 5 TABLET ORAL at 09:20

## 2024-02-27 RX ADMIN — Medication 60 MILLIGRAM(S): at 09:19

## 2024-02-27 RX ADMIN — ONDANSETRON 4 MILLIGRAM(S): 8 TABLET, FILM COATED ORAL at 06:01

## 2024-02-27 RX ADMIN — AMLODIPINE BESYLATE 10 MILLIGRAM(S): 2.5 TABLET ORAL at 05:53

## 2024-02-27 RX ADMIN — HEPARIN SODIUM 5000 UNIT(S): 5000 INJECTION INTRAVENOUS; SUBCUTANEOUS at 05:53

## 2024-02-27 RX ADMIN — Medication 300 MILLIGRAM(S): at 05:52

## 2024-02-27 NOTE — PROGRESS NOTE ADULT - PROBLEM SELECTOR PROBLEM 1
Date: 1/7/2019   Enmanuel Armenta [id number] is a 65 y.o. male who has been consulted for vancomycin dosing.    The patient has the following labs:     Creatinine (mg/dl)    WBC Count   Serum creatinine: 1.1 mg/dL 01/07/19 0305  Estimated creatinine clearance: 62.6 mL/min Lab Results   Component Value Date    WBC 16.00 (H) 01/07/2019        Current weight is 74.5 kg (164 lb 3.9 oz)      Pt is receiving vancomycin 1000 mg every 24 hours.  Vancomycin trough from 1/7/2019 at 1414 was 21.2 mg/dL.  Target goal is 15-20 mg/dL.    Trough was drawn on time and anticipate it is  Supratherapeutic. Pharmacy will decrease current dose.   The patient will be changed to a vancomycin dose of 750 mg every 24 hours. The vancomycin trough has been ordered for 1/9/19 at 1800.  Patient will be followed by pharmacy for changes in renal function, toxicity, and efficacy.    Thank you for allowing us to participate in this patient's care.     Morgan An, Pharmacist     
Hypertensive urgency

## 2024-02-27 NOTE — PROGRESS NOTE ADULT - NS_MD_PANP_GEN_ALL_CORE
16
Attending and PA/NP shared services statement (NON-critical care):

## 2024-02-27 NOTE — PROGRESS NOTE ADULT - PROBLEM SELECTOR PLAN 1
.  - SBP still elevated, may be due to anxiety at this time.  - SBP is in the 160-170s now, monitor BP   - s/p LHC/RHC, elevated right heart pressures, clean coronaries  - will adjust medications. Change BB to coreg 25mg PO BID, change amlodipine to nifedipine 60mg PO daily, add isosorbide 20mg PO TID   - pt reports no headaches at this time.  - MRI head negative  - cMRI unable to be performed due to sCR. will plan for OP workup

## 2024-02-27 NOTE — PROGRESS NOTE ADULT - PROBLEM SELECTOR PROBLEM 3
Acute renal failure
Acute renal failure
Mild left ventricular systolic dysfunction (LVSD)
Acute renal failure
Mild left ventricular systolic dysfunction (LVSD)
Acute renal failure
Acute renal failure

## 2024-02-27 NOTE — DISCHARGE NOTE PROVIDER - CARE PROVIDER_API CALL
Lawrence Rosado  Cardiovascular Disease  [address] [place] [address]  Phone: [phone]  Fax: [phone]  Follow Up Time: 2 weeks    Eligio Reed  Nephrology  [address] [place] [id number]  Phone: [phone]  Fax: [phone]  Follow Up Time:

## 2024-02-27 NOTE — PROGRESS NOTE ADULT - ASSESSMENT
The patient is a 38 year old male with  no known past medical history undomiciled currently living in his car working in [employer], Tobacco Abuse/Chronic Smoker presents to [facility] ER c/o worsening HA for past 2 weeks admitted for HA 2/2 HTN Urgency c/b Elevated Troponin and ARF v CKD. Cardiology and nephrology were consulted. EKG NSR LVH Diffuse TW changes.  TTE with EF 40% and significant LV strain and severe pulmonary hypertension. Patient screened for causes of hypertension, Renin/aldosterone leves were elevated however ratio was normal. Renal artery duplex was concerning for possible stenosis. MRA was normal. COmplaints of persistent headache, CT head and MRI were negative. Headache likely secondary to hypertension. Status post LHC/RHC with normal coronaries elevated LVEDP status post dose of lasix. PYP scan was negative for amyloidosis. Cardiac MRI was postponed due to MICA/CKD to be done as an outpatient. TO continue aspirin, coreg, hydralazine, imdur and nifedepine. To follow up with the [facility] in 1 week.     Assessment/Plan:    1. Hypertensive urgency   - Headache suspected secondary to elevated BP  -CTH WO negative  -MR brain without acute pathology  -Pain regimen; Opioids PO and parenteral  -EKG NSR LVH Diffuse TW changes  -TTE with EF 40% and significant LV strain and severe pulmonary hypertension   - PYP scan not suggestive of cardiac amyloid  - Renin/Aldosterone levels elevated, ratio normal  - Urine metanephrine serum metanephrine pending  - TSH WNL  - Renal artery duplex concerning for possible stenosis, MRa without any stenosis  - Hydralazine 100mg PO q8, Isordil, nefedepine, coreg and aspirin     2. Cardiomyopathy  - Non ischemic cardiomyopathy  - Amyloidosis ruled out on PYP Scan  - Cardiac MRI to be done as an outpatietn in the setting of MICA  _ Status post RHC/ LCH with normal coronaries and elevated LVEDP    3., ARF v CKD  -Likely 2/2 hypertensive renal disease   -CPK WNL  -Renal Artery Duplex/US result noted above, MRA negative     4.  Normocytic Anemia likely 2/2 AOCKD  -B12/Folate normal. low ferritin and iron  -ferrous sulfate QD  -Trend CBC    5,  Tobacco Abuse  - Cessation. Nicotine Patch PRN withdrawal    6.  Constipation  - bowel regimen    7.  Undomiciled  -Declined SW consultation    Discharge home to follow up with [facility] in 1 week

## 2024-02-27 NOTE — PROGRESS NOTE ADULT - PROBLEM SELECTOR PROBLEM 4
R/O Amyloidosis, unspecified type
MICA (acute kidney injury)
R/O Amyloidosis, unspecified type
MICA (acute kidney injury)

## 2024-02-27 NOTE — PROGRESS NOTE ADULT - ASSESSMENT
39 y/o male with No past medical hx  has not been to a Dr in many years who presents with 3 weeks of headaches, dizziness and vomiting.  Patients symptoms persisted today and he could not take it any longer and he presented to the ER.   On evaluation he was found to have a b/p of 227/147.  We are being asked to see him for b/p control. He states he does not get any chest pain when he walks but did get pain in his chest last night when he went to the bathroom but it was on inspiration and   he has had not chest pain today.  He denies any back pain or head trauma  ekg changes c/w LVH  trop 71  bnp 4538

## 2024-02-27 NOTE — DISCHARGE NOTE NURSING/CASE MANAGEMENT/SOCIAL WORK - NSDCPEFALRISK_GEN_ALL_CORE
For information on Fall & Injury Prevention, visit: [email or web address] OR  [email or web address] OR  https://www.cdc.gov/steadi/patient.html

## 2024-02-27 NOTE — DISCHARGE NOTE PROVIDER - NSDCMRMEDTOKEN_GEN_ALL_CORE_FT
aspirin 81 mg oral tablet, chewable: 1 tab(s) orally once a day  carvedilol 25 mg oral tablet: 1 tab(s) orally every 12 hours  ferrous sulfate 325 mg (65 mg elemental iron) oral tablet: 1 tab(s) orally once a day  hydrALAZINE 100 mg oral tablet: 1 tab(s) orally every 8 hours  isosorbide dinitrate 20 mg oral tablet: 1 tab(s) orally 3 times a day  NIFEdipine 60 mg oral tablet, extended release: 1 tab(s) orally once a day

## 2024-02-27 NOTE — PROGRESS NOTE ADULT - PROVIDER SPECIALTY LIST ADULT
Cardiology
Hospitalist
Nephrology
Nephrology
Cardiology
Hospitalist
Cardiology
Hospitalist
Hospitalist
Cardiology
Cardiology
Hospitalist
Cardiology
Cardiology

## 2024-02-27 NOTE — PROGRESS NOTE ADULT - PROBLEM SELECTOR PLAN 2
.  - likely NSTEMI type II in the setting of renal dysfunction   - pt denies chest pain, palpitations, and anginal equivalent symptoms.   - TTE with EF 40 to 45%, G3DD, PASP 61  - Trps 21->67->86->11->125  - PYP can not indicative of TTR amyloid  - s/p LHC/RHC, elevated right heart pressures, clean coronaries

## 2024-02-27 NOTE — PROGRESS NOTE ADULT - NS ATTEND AMEND GEN_ALL_CORE FT
Patient on optimized medical therapy for HTN and non ischemic CMP. Suggest outpt follow up with cardiology and potential CMR.

## 2024-02-27 NOTE — DISCHARGE NOTE PROVIDER - NSDCCPCAREPLAN_GEN_ALL_CORE_FT
PRINCIPAL DISCHARGE DIAGNOSIS  Diagnosis: Hypertensive urgency  Assessment and Plan of Treatment: Please take medications as prescribed and follow up with with a Cardiologist      SECONDARY DISCHARGE DIAGNOSES  Diagnosis: Acute renal failure  Assessment and Plan of Treatment: Follow up with a Nephrologist.    Diagnosis: Cardiomyopathy  Assessment and Plan of Treatment: Please take medications as prescribed and follow up with a Cardiologist to schedule a cardiac MRI.    Diagnosis: NSTEMI (non-ST elevation myocardial infarction)  Assessment and Plan of Treatment: Left heart catherization performed on 2/27/2024.  Please take medications as prescribed and follow up with a Cardiologist.

## 2024-02-27 NOTE — PROGRESS NOTE ADULT - PROBLEM SELECTOR PLAN 3
.  - appreciate nephrology reccs  - Renal US unremarkable   - renal MRI without significant stenosis  - US duplex Mid-renal peak systolic velocity to aortic ratio is moderately elevated at 2.58. Normal arterial waveforms  - Cr elevated today from previous 2.39 -> 2.56 ->2.75   - monitor renal function daily  - cMRI canceled due to Cr / eGFR 29 per nephrology reccs

## 2024-02-27 NOTE — DISCHARGE NOTE PROVIDER - HOSPITAL COURSE
The patient is a 38 year old male with  no known past medical history undomiciled currently living in his car working in [employer], Tobacco Abuse/Chronic Smoker presents to [facility] ER c/o worsening HA for past 2 weeks admitted for HA 2/2 HTN Urgency c/b Elevated Troponin and ARF v CKD. CTH negative. MR brain without acute pathology. EKG NSR LVH diffuse TW changes. TTE with EF 40% and significant LV strain and severe pulmonary hypertension. TSH WNL. Renal artery duplex concerning for possible stenosis, MRA without any stenosis.  Pt also evaluated for non-ischemic cardiomyopathy. Amyloidosis ruled out on PYP scan. PETTY Kappa 2.98 (elevated), PETTY Lambda 1.81(WNL), [place]/Lambda free light chain ratio 1.65Cardiology consulted and LHC performed on 2/27/2024 and showed elevated right heart pressures, clean coronaries. Pt with MICA, nephrology consulted and following. Cardiac MrI post-poned due to elevated creatinine. Pt did well post-procedure and cleared by Cardiology to follow up as outpt for cardiac MRI.

## 2024-02-27 NOTE — PROGRESS NOTE ADULT - SUBJECTIVE AND OBJECTIVE BOX
CC: Follow up     INTERVAL HPI/OVERNIGHT EVENTS: Patient seen and examined, denies sob chest pain or palpitations. Cardiac MRI was cancelled this morning due to MICA/CKD. Patient adamant on going home today       Vital Signs Last 24 Hrs  T(C): 36.7 (27 Feb 2024 04:38), Max: 36.8 (26 Feb 2024 14:00)  T(F): 98 (27 Feb 2024 04:38), Max: 98.2 (26 Feb 2024 14:00)  HR: 81 (27 Feb 2024 04:38) (78 - 89)  BP: 154/77 (27 Feb 2024 04:38) (125/78 - 176/100)  BP(mean): --  RR: 18 (27 Feb 2024 04:38) (15 - 20)  SpO2: 96% (27 Feb 2024 04:38) (96% - 98%)    Parameters below as of 26 Feb 2024 21:16  Patient On (Oxygen Delivery Method): room air        PHYSICAL EXAM:    GENERAL: NAD, AOX3  HEAD:  Atraumatic, Normocephalic  EYES: conjunctiva and sclera clear  ENMT: Moist mucous membranes  CHEST/LUNG: Clear to auscultation bilaterally; No rales, rhonchi, wheezing, or rubs  HEART: Regular rate and rhythm; No murmurs, rubs, or gallops  ABDOMEN: Soft, Nontender, Nondistended; Bowel sounds present  EXTREMITIES:  2+ Peripheral Pulses, No clubbing, cyanosis, or edema        MEDICATIONS  (STANDING):  aspirin  chewable 81 milliGRAM(s) Oral daily  carvedilol 25 milliGRAM(s) Oral every 12 hours  ferrous    sulfate 325 milliGRAM(s) Oral daily  heparin   Injectable 5000 Unit(s) SubCutaneous every 8 hours  hydrALAZINE 100 milliGRAM(s) Oral every 8 hours  isosorbide   dinitrate Tablet (ISORDIL) 20 milliGRAM(s) Oral three times a day  NIFEdipine XL 60 milliGRAM(s) Oral daily  senna 2 Tablet(s) Oral at bedtime    MEDICATIONS  (PRN):  acetaminophen     Tablet .. 650 milliGRAM(s) Oral every 6 hours PRN Temp greater or equal to 38C (100.4F), Mild Pain (1 - 3)  acetaminophen 325 mG/butalbital 50 mG/caffeine 40 mG 2 Tablet(s) Oral every 8 hours PRN Headache  aluminum hydroxide/magnesium hydroxide/simethicone Suspension 30 milliLiter(s) Oral every 4 hours PRN Dyspepsia  HYDROmorphone  Injectable 1 milliGRAM(s) IV Push every 4 hours PRN breathrough pain  labetalol Injectable 10 milliGRAM(s) IV Push every 4 hours PRN SBP >170  melatonin 3 milliGRAM(s) Oral at bedtime PRN Insomnia  metoclopramide Injectable 10 milliGRAM(s) IV Push three times a day PRN emesis  ondansetron Injectable 4 milliGRAM(s) IV Push every 8 hours PRN Nausea and/or Vomiting  oxyCODONE    IR 5 milliGRAM(s) Oral three times a day PRN Moderate Pain (4 - 6) and Severe Pain (7-10)  polyethylene glycol 3350 17 Gram(s) Oral daily PRN Constipation      Allergies    No Known Allergies    Intolerances          LABS:                          10.1   10.17 )-----------( 401      ( 27 Feb 2024 06:24 )             30.6     02-27    133<L>  |  97  |  29.0<H>  ----------------------------<  103<H>  3.7   |  24.0  |  2.75<H>    Ca    9.0      27 Feb 2024 06:24    TPro  6.2<L>  /  Alb  3.6  /  TBili  0.7  /  DBili  0.2  /  AST  58<H>  /  ALT  80<H>  /  AlkPhos  56  02-27      Urinalysis Basic - ( 27 Feb 2024 06:24 )    Color: x / Appearance: x / SG: x / pH: x  Gluc: 103 mg/dL / Ketone: x  / Bili: x / Urobili: x   Blood: x / Protein: x / Nitrite: x   Leuk Esterase: x / RBC: x / WBC x   Sq Epi: x / Non Sq Epi: x / Bacteria: x        RADIOLOGY & ADDITIONAL TESTS:

## 2024-02-27 NOTE — PROGRESS NOTE ADULT - PROBLEM SELECTOR PLAN 4
.  - appreciate HF reccs  - PYP scan negative for TTR amyloidosis  - PETTY Kappa 2.98 (elevated), PETTY Lambda 1.81(WNL), [place]/Lambda free light chain ratio 1.65  - pending cMRI. Will likely need to be performed as OP due to renal function.       From cardiology standpoint, patient ok to follow up as OP for cMRI. Continue current medication management, goal SBP 130s.   No further inpatient cardiac work up at this time. Patient should follow up as OP in 2 weeks to schedule cMRI.  Will sign off.  Please reconsult if needed.

## 2024-02-27 NOTE — DISCHARGE NOTE PROVIDER - CARE PROVIDERS DIRECT ADDRESSES
,DirectAddress_Unknown,[email or web address]

## 2024-02-27 NOTE — PROGRESS NOTE ADULT - REASON FOR ADMISSION
HTN Urgency, Elevated Trop, ARF v CKD, Anemia

## 2024-02-28 PROBLEM — Z00.00 ENCOUNTER FOR PREVENTIVE HEALTH EXAMINATION: Status: ACTIVE | Noted: 2024-02-28

## 2024-02-29 LAB
CREATININE, RNDM UR: 148.2 MG/DL — SIGNIFICANT CHANGE UP
METANEPHS 24H UR-MCNC: 0.6 — SIGNIFICANT CHANGE UP (ref 0–1)
METANEPHS 24H UR-MCNC: 256 UG/L — SIGNIFICANT CHANGE UP
NORMETANEPHRINE.: 632 UG/L — SIGNIFICANT CHANGE UP

## 2024-03-06 LAB
METANEPHRINE, PL: 102.2 PG/ML — HIGH (ref 0–88)
NORMETANEPHRINE, PL: 444.5 PG/ML — HIGH (ref 0–210.1)

## 2024-05-16 NOTE — H&P ADULT - NSHPPHYSICALEXAM_GEN_ALL_CORE
complains of pain/discomfort Vital Signs Last 24 Hrs  T(C): 36.8 (20 Feb 2024 23:39), Max: 36.8 (20 Feb 2024 23:39)  T(F): 98.2 (20 Feb 2024 23:39), Max: 98.2 (20 Feb 2024 23:39)  HR: 98 (20 Feb 2024 23:39) (85 - 102)  BP: 184/104 (21 Feb 2024 00:13) (153/75 - 227/142)  BP(mean): --  RR: 18 (20 Feb 2024 23:39) (16 - 20)  SpO2: 98% (20 Feb 2024 23:39) (97% - 98%)    Parameters below as of 20 Feb 2024 23:39  Patient On (Oxygen Delivery Method): room air    Constitutional: Well-appearing, NAD, VSS  Head: NC/AT  Eyes: PERRL, EOMI, anicteric sclera, conjunctiva WNL  ENT: Normal Pharynx, MMM, No tonsillar exudate/erythema  Neck: Supple, Non-tender  Chest: Non-tender, no rashes  Cardio: RRR, s1/s2, no appreciable murmurs/rubs/gallops  Resp: BS CTA bilaterally, no wheezing/rhonchi/rales  Abd: Soft, Non-tender, Non-distended, no rebound/guarding/rigidity  : not examined  Rectal: not examined  MSK: moving all extremities, no motor weakness, full ROM x4  Ext: palpable distal pulses, good capillary refill, no clubbing/cyanosis/edema  Psych: appropriate, cooperative  Neuro: CN II-XII grossly intact, no focal deficits  Skin: Warm/Dry. No rashes.

## 2024-05-30 ENCOUNTER — APPOINTMENT (OUTPATIENT)
Dept: NEPHROLOGY | Facility: CLINIC | Age: 39
End: 2024-05-30

## 2024-05-30 ENCOUNTER — APPOINTMENT (OUTPATIENT)
Dept: NEPHROLOGY | Facility: CLINIC | Age: 39
End: 2024-05-30
Payer: COMMERCIAL

## 2024-05-30 VITALS
HEIGHT: 72 IN | HEART RATE: 71 BPM | WEIGHT: 230 LBS | SYSTOLIC BLOOD PRESSURE: 142 MMHG | OXYGEN SATURATION: 98 % | BODY MASS INDEX: 31.15 KG/M2 | DIASTOLIC BLOOD PRESSURE: 82 MMHG

## 2024-05-30 DIAGNOSIS — N18.9 CHRONIC KIDNEY DISEASE, UNSPECIFIED: ICD-10-CM

## 2024-05-30 PROBLEM — J45.909 UNSPECIFIED ASTHMA, UNCOMPLICATED: Chronic | Status: ACTIVE | Noted: 2024-02-20

## 2024-05-30 PROCEDURE — 99204 OFFICE O/P NEW MOD 45 MIN: CPT

## 2024-05-31 LAB
25(OH)D3 SERPL-MCNC: 26.5 NG/ML
ALBUMIN SERPL ELPH-MCNC: 4.7 G/DL
ALP BLD-CCNC: 61 U/L
ALT SERPL-CCNC: 16 U/L
ANION GAP SERPL CALC-SCNC: 16 MMOL/L
APPEARANCE: CLEAR
AST SERPL-CCNC: 23 U/L
BACTERIA: NEGATIVE /HPF
BASOPHILS # BLD AUTO: 0.08 K/UL
BASOPHILS NFR BLD AUTO: 1 %
BILIRUB SERPL-MCNC: 0.2 MG/DL
BILIRUBIN URINE: NEGATIVE
BLOOD URINE: NEGATIVE
BUN SERPL-MCNC: 29 MG/DL
CALCIUM OXALATE CRYSTALS: PRESENT
CALCIUM SERPL-MCNC: 9.6 MG/DL
CALCIUM SERPL-MCNC: 9.6 MG/DL
CAST: 5 /LPF
CHLORIDE SERPL-SCNC: 105 MMOL/L
CO2 SERPL-SCNC: 19 MMOL/L
COLOR: YELLOW
CREAT SERPL-MCNC: 2.27 MG/DL
CREAT SPEC-SCNC: 427 MG/DL
EGFR: 37 ML/MIN/1.73M2
EOSINOPHIL # BLD AUTO: 0.12 K/UL
EOSINOPHIL NFR BLD AUTO: 1.5 %
EPITHELIAL CELLS: 2 /HPF
FERRITIN SERPL-MCNC: 33 NG/ML
FOLATE SERPL-MCNC: 10.8 NG/ML
GLUCOSE QUALITATIVE U: NEGATIVE MG/DL
GLUCOSE SERPL-MCNC: 86 MG/DL
HCT VFR BLD CALC: 34.5 %
HGB BLD-MCNC: 10.8 G/DL
HYALINE CASTS: PRESENT
IMM GRANULOCYTES NFR BLD AUTO: 0.3 %
IRON SATN MFR SERPL: 52 %
IRON SERPL-MCNC: 225 UG/DL
KETONES URINE: ABNORMAL MG/DL
LEUKOCYTE ESTERASE URINE: NEGATIVE
LYMPHOCYTES # BLD AUTO: 1.15 K/UL
LYMPHOCYTES NFR BLD AUTO: 14.7 %
MAN DIFF?: NORMAL
MCHC RBC-ENTMCNC: 28.8 PG
MCHC RBC-ENTMCNC: 31.3 GM/DL
MCV RBC AUTO: 92 FL
MICROALBUMIN 24H UR DL<=1MG/L-MCNC: 4.1 MG/DL
MICROALBUMIN/CREAT 24H UR-RTO: 10 MG/G
MICROSCOPIC-UA: NORMAL
MONOCYTES # BLD AUTO: 0.84 K/UL
MONOCYTES NFR BLD AUTO: 10.8 %
NEUTROPHILS # BLD AUTO: 5.6 K/UL
NEUTROPHILS NFR BLD AUTO: 71.7 %
NITRITE URINE: NEGATIVE
PARATHYROID HORMONE INTACT: 44 PG/ML
PH URINE: 5.5
PLATELET # BLD AUTO: 316 K/UL
POTASSIUM SERPL-SCNC: 4.3 MMOL/L
PROT SERPL-MCNC: 7.7 G/DL
PROTEIN URINE: 30 MG/DL
RBC # BLD: 3.75 M/UL
RBC # FLD: 17.8 %
RED BLOOD CELLS URINE: 2 /HPF
REVIEW: NORMAL
SODIUM SERPL-SCNC: 140 MMOL/L
SPECIFIC GRAVITY URINE: >1.03
TIBC SERPL-MCNC: 434 UG/DL
UIBC SERPL-MCNC: 209 UG/DL
URATE SERPL-MCNC: 6.3 MG/DL
UROBILINOGEN URINE: 0.2 MG/DL
VIT B12 SERPL-MCNC: 869 PG/ML
WBC # FLD AUTO: 7.81 K/UL
WHITE BLOOD CELLS URINE: 2 /HPF

## 2024-06-17 NOTE — HISTORY OF PRESENT ILLNESS
[FreeTextEntry1] : Patient is a 38-year-old male with past medical history significant for hypertension, smoking who presented for discussion of labs and initial evaluation of renal function. He denied increased urination frequency, dysuria, gross hematuria, fever, chills, skin rash or itching, nausea, vomiting, diarrhea, bleeding problems and joint pain or swelling. Review of other systems was negative.  Review of systems: All systems were reviewed in detail, pertinent positive and negative have been mentioned above, otherwise negative.  Past medical and surgical history: Hypertension, adenoidectomy Allergies: NKDA Family history: No pertinent family history of renal disease or electrolyte disorder in first degree relatives. Social history: Active smoker, denies alcohol or drug use.

## 2024-06-17 NOTE — PHYSICAL EXAM
[TextEntry] : Gen: no acute distress MS: alert, conversing normally Eyes: EOMI, no icterus HENT: NCAT, MMM CV: rhythm reg reg, rate normal, no m/g/r, no LE edema Chest: CTAB, no w/r/r, Abd: soft, NT, ND Neuro: moving all 4 limbs spontaneously, no tremor MSK: normal bulk and tone, no joint swelling Skin: dry, warm, no rash or jaundice

## 2024-06-17 NOTE — ASSESSMENT
[FreeTextEntry1] : 38-year-old male with past medical history significant for hypertension, smoking who presented for discussion of labs and initial evaluation of renal function.  Chronic kidney disease stage III: Patient baseline serum creatinine closer to 2-2.2 mg/deciliter for a EGFR of 35-40.  CKD likely related to hypertensive nephrosclerosis. Ultrasound obtained in February 2024 unremarkable, US kidney duplex with no renal artery stenosis I will repeat labs, quantify proteinuria Discussed in detail the importance of maintaining blood pressure less than 130/80 and LDL less than 100  Hypertension: Previously admitted to the hospital earlier this year with hypertensive urgency and the workup for secondary causes was negative.  Blood pressure 142/82 mmHg today.  Patient to continue on amlodipine and losartan.  RTC 3 months

## 2025-05-23 NOTE — PATIENT PROFILE ADULT - NSPRESCRALCAMT_GEN_A_NUR
[facility]     PT arrived via:  [x] Ambulatory         [] Wheelchair  []With transport                [] Other:     [x]PT oriented to room and call light in reach.   [x] Vital signs obtained and are stable.   [x]Medication education provided and reviewed with patient.  Labs drawn per order and sent to lab. 1542- INVanz started, call light is within reach, care continued. 1612- infusion complete, patient tolerated. Patient refused AVS and left unit ambulatory. All equipment used in room cleaned.                
3 or 4